# Patient Record
Sex: FEMALE | Race: WHITE | NOT HISPANIC OR LATINO | Employment: OTHER | URBAN - METROPOLITAN AREA
[De-identification: names, ages, dates, MRNs, and addresses within clinical notes are randomized per-mention and may not be internally consistent; named-entity substitution may affect disease eponyms.]

---

## 2024-01-01 ENCOUNTER — APPOINTMENT (OUTPATIENT)
Dept: OCCUPATIONAL THERAPY | Facility: HOSPITAL | Age: 89
End: 2024-01-01
Attending: STUDENT IN AN ORGANIZED HEALTH CARE EDUCATION/TRAINING PROGRAM
Payer: MEDICARE

## 2024-01-01 ENCOUNTER — APPOINTMENT (OUTPATIENT)
Dept: PHYSICAL THERAPY | Facility: HOSPITAL | Age: 89
End: 2024-01-01
Attending: STUDENT IN AN ORGANIZED HEALTH CARE EDUCATION/TRAINING PROGRAM
Payer: MEDICARE

## 2024-01-01 ENCOUNTER — APPOINTMENT (OUTPATIENT)
Dept: CT IMAGING | Facility: HOSPITAL | Age: 89
End: 2024-01-01
Attending: EMERGENCY MEDICINE
Payer: MEDICARE

## 2024-01-01 ENCOUNTER — LAB REQUISITION (OUTPATIENT)
Dept: LAB | Facility: CLINIC | Age: 89
End: 2024-01-01
Payer: MEDICARE

## 2024-01-01 ENCOUNTER — APPOINTMENT (OUTPATIENT)
Dept: RADIOLOGY | Facility: HOSPITAL | Age: 89
End: 2024-01-01
Attending: EMERGENCY MEDICINE
Payer: MEDICARE

## 2024-01-01 ENCOUNTER — HOSPITAL ENCOUNTER (OUTPATIENT)
Facility: HOSPITAL | Age: 89
Setting detail: OBSERVATION
Discharge: NURSING, REHAB OR LONG-TERM CARE | End: 2024-08-31
Attending: EMERGENCY MEDICINE | Admitting: EMERGENCY MEDICINE
Payer: MEDICARE

## 2024-01-01 ENCOUNTER — HOSPITAL ENCOUNTER (EMERGENCY)
Facility: HOSPITAL | Age: 89
Discharge: NURSING, REHAB OR LONG-TERM CARE | End: 2024-05-23
Attending: EMERGENCY MEDICINE | Admitting: EMERGENCY MEDICINE
Payer: MEDICARE

## 2024-01-01 ENCOUNTER — LAB REQUISITION (OUTPATIENT)
Dept: LAB | Facility: CLINIC | Age: 89
End: 2024-01-01
Payer: OTHER MISCELLANEOUS

## 2024-01-01 ENCOUNTER — APPOINTMENT (OUTPATIENT)
Dept: MRI IMAGING | Facility: HOSPITAL | Age: 89
End: 2024-01-01
Attending: EMERGENCY MEDICINE
Payer: MEDICARE

## 2024-01-01 ENCOUNTER — APPOINTMENT (OUTPATIENT)
Dept: ULTRASOUND IMAGING | Facility: HOSPITAL | Age: 89
End: 2024-01-01
Attending: STUDENT IN AN ORGANIZED HEALTH CARE EDUCATION/TRAINING PROGRAM
Payer: MEDICARE

## 2024-01-01 ENCOUNTER — APPOINTMENT (OUTPATIENT)
Dept: CARDIOLOGY | Facility: HOSPITAL | Age: 89
End: 2024-01-01
Attending: STUDENT IN AN ORGANIZED HEALTH CARE EDUCATION/TRAINING PROGRAM
Payer: MEDICARE

## 2024-01-01 VITALS
RESPIRATION RATE: 18 BRPM | TEMPERATURE: 98.8 F | SYSTOLIC BLOOD PRESSURE: 125 MMHG | DIASTOLIC BLOOD PRESSURE: 88 MMHG | HEIGHT: 71 IN | WEIGHT: 140 LBS | BODY MASS INDEX: 19.6 KG/M2 | HEART RATE: 95 BPM | OXYGEN SATURATION: 100 %

## 2024-01-01 VITALS
HEART RATE: 78 BPM | OXYGEN SATURATION: 100 % | SYSTOLIC BLOOD PRESSURE: 187 MMHG | TEMPERATURE: 98.8 F | DIASTOLIC BLOOD PRESSURE: 95 MMHG | RESPIRATION RATE: 15 BRPM

## 2024-01-01 DIAGNOSIS — E03.8 OTHER SPECIFIED HYPOTHYROIDISM: ICD-10-CM

## 2024-01-01 DIAGNOSIS — G30.1 ALZHEIMER'S DISEASE WITH LATE ONSET (CODE) (H): ICD-10-CM

## 2024-01-01 DIAGNOSIS — S22.000A THORACIC COMPRESSION FRACTURE, CLOSED, INITIAL ENCOUNTER (H): ICD-10-CM

## 2024-01-01 DIAGNOSIS — R63.0 ANOREXIA: ICD-10-CM

## 2024-01-01 DIAGNOSIS — R29.6 REPEATED FALLS: ICD-10-CM

## 2024-01-01 DIAGNOSIS — L89.90 PRESSURE INJURY OF SKIN, UNSPECIFIED INJURY STAGE, UNSPECIFIED LOCATION: ICD-10-CM

## 2024-01-01 DIAGNOSIS — S32.000A LUMBAR COMPRESSION FRACTURE, CLOSED, INITIAL ENCOUNTER (H): ICD-10-CM

## 2024-01-01 DIAGNOSIS — R55 SYNCOPE AND COLLAPSE: ICD-10-CM

## 2024-01-01 DIAGNOSIS — I10 ESSENTIAL (PRIMARY) HYPERTENSION: ICD-10-CM

## 2024-01-01 DIAGNOSIS — R10.2 PELVIC PAIN: ICD-10-CM
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
## 2024-01-01 DIAGNOSIS — M15.8 OTHER POLYOSTEOARTHRITIS: ICD-10-CM

## 2024-01-01 LAB
ALBUMIN SERPL BCG-MCNC: 3.7 G/DL (ref 3.5–5.2)
ALBUMIN SERPL BCG-MCNC: 4.1 G/DL (ref 3.5–5.2)
ALBUMIN UR-MCNC: 20 MG/DL
ALP SERPL-CCNC: 88 U/L (ref 40–150)
ALP SERPL-CCNC: 91 U/L (ref 40–150)
ALT SERPL W P-5'-P-CCNC: 10 U/L (ref 0–50)
ALT SERPL W P-5'-P-CCNC: 14 U/L (ref 0–50)
ANION GAP SERPL CALCULATED.3IONS-SCNC: 11 MMOL/L (ref 7–15)
ANION GAP SERPL CALCULATED.3IONS-SCNC: 11 MMOL/L (ref 7–15)
ANION GAP SERPL CALCULATED.3IONS-SCNC: 12 MMOL/L (ref 7–15)
ANION GAP SERPL CALCULATED.3IONS-SCNC: 18 MMOL/L (ref 7–15)
APPEARANCE UR: CLEAR
AST SERPL W P-5'-P-CCNC: 21 U/L (ref 0–45)
AST SERPL W P-5'-P-CCNC: 26 U/L (ref 0–45)
ATRIAL RATE - MUSE: 76 BPM
ATRIAL RATE - MUSE: 80 BPM
BASOPHILS # BLD AUTO: 0.1 10E3/UL (ref 0–0.2)
BASOPHILS NFR BLD AUTO: 1 %
BILIRUB SERPL-MCNC: 0.4 MG/DL
BILIRUB SERPL-MCNC: 0.5 MG/DL
BILIRUB UR QL STRIP: NEGATIVE
BUN SERPL-MCNC: 15.3 MG/DL (ref 8–23)
BUN SERPL-MCNC: 17 MG/DL (ref 8–23)
BUN SERPL-MCNC: 19.3 MG/DL (ref 8–23)
BUN SERPL-MCNC: 24.4 MG/DL (ref 8–23)
CALCIUM SERPL-MCNC: 10 MG/DL (ref 8.8–10.2)
CALCIUM SERPL-MCNC: 9.1 MG/DL (ref 8.8–10.4)
CALCIUM SERPL-MCNC: 9.4 MG/DL (ref 8.8–10.4)
CALCIUM SERPL-MCNC: 9.5 MG/DL (ref 8.8–10.2)
CHLORIDE SERPL-SCNC: 105 MMOL/L (ref 98–107)
CHLORIDE SERPL-SCNC: 106 MMOL/L (ref 98–107)
CHLORIDE SERPL-SCNC: 107 MMOL/L (ref 98–107)
CHLORIDE SERPL-SCNC: 108 MMOL/L (ref 98–107)
COLOR UR AUTO: YELLOW
CREAT SERPL-MCNC: 0.82 MG/DL (ref 0.51–0.95)
CREAT SERPL-MCNC: 0.93 MG/DL (ref 0.51–0.95)
CREAT SERPL-MCNC: 1.01 MG/DL (ref 0.51–0.95)
CREAT SERPL-MCNC: 1.21 MG/DL (ref 0.51–0.95)
DEPRECATED HCO3 PLAS-SCNC: 16 MMOL/L (ref 22–29)
DEPRECATED HCO3 PLAS-SCNC: 23 MMOL/L (ref 22–29)
DIASTOLIC BLOOD PRESSURE - MUSE: 90 MMHG
DIASTOLIC BLOOD PRESSURE - MUSE: NORMAL MMHG
EGFRCR SERPLBLD CKD-EPI 2021: 43 ML/MIN/1.73M2
EGFRCR SERPLBLD CKD-EPI 2021: 54 ML/MIN/1.73M2
EGFRCR SERPLBLD CKD-EPI 2021: 59 ML/MIN/1.73M2
EGFRCR SERPLBLD CKD-EPI 2021: 69 ML/MIN/1.73M2
EOSINOPHIL # BLD AUTO: 0.1 10E3/UL (ref 0–0.7)
EOSINOPHIL NFR BLD AUTO: 1 %
ERYTHROCYTE [DISTWIDTH] IN BLOOD BY AUTOMATED COUNT: 12.9 % (ref 10–15)
ERYTHROCYTE [DISTWIDTH] IN BLOOD BY AUTOMATED COUNT: 12.9 % (ref 10–15)
ERYTHROCYTE [DISTWIDTH] IN BLOOD BY AUTOMATED COUNT: 13.5 % (ref 10–15)
ERYTHROCYTE [DISTWIDTH] IN BLOOD BY AUTOMATED COUNT: 14.2 % (ref 10–15)
GLUCOSE BLDC GLUCOMTR-MCNC: 102 MG/DL (ref 70–99)
GLUCOSE BLDC GLUCOMTR-MCNC: 76 MG/DL (ref 70–99)
GLUCOSE SERPL-MCNC: 125 MG/DL (ref 70–99)
GLUCOSE SERPL-MCNC: 136 MG/DL (ref 70–99)
GLUCOSE SERPL-MCNC: 75 MG/DL (ref 70–99)
GLUCOSE SERPL-MCNC: 82 MG/DL (ref 70–99)
GLUCOSE UR STRIP-MCNC: NEGATIVE MG/DL
HBA1C MFR BLD: 5.3 %
HCO3 SERPL-SCNC: 21 MMOL/L (ref 22–29)
HCO3 SERPL-SCNC: 23 MMOL/L (ref 22–29)
HCT VFR BLD AUTO: 39.1 % (ref 35–47)
HCT VFR BLD AUTO: 39.1 % (ref 35–47)
HCT VFR BLD AUTO: 41 % (ref 35–47)
HCT VFR BLD AUTO: 47.1 % (ref 35–47)
HGB BLD-MCNC: 12.8 G/DL (ref 11.7–15.7)
HGB BLD-MCNC: 12.9 G/DL (ref 11.7–15.7)
HGB BLD-MCNC: 13.4 G/DL (ref 11.7–15.7)
HGB BLD-MCNC: 15.5 G/DL (ref 11.7–15.7)
HGB UR QL STRIP: NEGATIVE
HOLD SPECIMEN: NORMAL
HYALINE CASTS: 6 /LPF
IMM GRANULOCYTES # BLD: 0 10E3/UL
IMM GRANULOCYTES NFR BLD: 0 %
INTERPRETATION ECG - MUSE: NORMAL
INTERPRETATION ECG - MUSE: NORMAL
KETONES UR STRIP-MCNC: ABNORMAL MG/DL
LEUKOCYTE ESTERASE UR QL STRIP: ABNORMAL
LVEF ECHO: NORMAL
LYMPHOCYTES # BLD AUTO: 1 10E3/UL (ref 0.8–5.3)
LYMPHOCYTES NFR BLD AUTO: 12 %
MAGNESIUM SERPL-MCNC: 2 MG/DL (ref 1.7–2.3)
MAGNESIUM SERPL-MCNC: 2 MG/DL (ref 1.7–2.3)
MCH RBC QN AUTO: 31.8 PG (ref 26.5–33)
MCH RBC QN AUTO: 32.7 PG (ref 26.5–33)
MCH RBC QN AUTO: 32.8 PG (ref 26.5–33)
MCH RBC QN AUTO: 33 PG (ref 26.5–33)
MCHC RBC AUTO-ENTMCNC: 32.7 G/DL (ref 31.5–36.5)
MCHC RBC AUTO-ENTMCNC: 32.7 G/DL (ref 31.5–36.5)
MCHC RBC AUTO-ENTMCNC: 32.9 G/DL (ref 31.5–36.5)
MCHC RBC AUTO-ENTMCNC: 33 G/DL (ref 31.5–36.5)
MCV RBC AUTO: 100 FL (ref 78–100)
MCV RBC AUTO: 97 FL (ref 78–100)
MONOCYTES # BLD AUTO: 0.4 10E3/UL (ref 0–1.3)
MONOCYTES NFR BLD AUTO: 5 %
MUCOUS THREADS #/AREA URNS LPF: PRESENT /LPF
NEUTROPHILS # BLD AUTO: 6.6 10E3/UL (ref 1.6–8.3)
NEUTROPHILS NFR BLD AUTO: 80 %
NITRATE UR QL: NEGATIVE
NRBC # BLD AUTO: 0 10E3/UL
NRBC BLD AUTO-RTO: 0 /100
P AXIS - MUSE: 63 DEGREES
P AXIS - MUSE: 81 DEGREES
PH UR STRIP: 6.5 [PH] (ref 5–7)
PHOSPHATE SERPL-MCNC: 2.8 MG/DL (ref 2.5–4.5)
PHOSPHATE SERPL-MCNC: 3 MG/DL (ref 2.5–4.5)
PLATELET # BLD AUTO: 238 10E3/UL (ref 150–450)
PLATELET # BLD AUTO: 275 10E3/UL (ref 150–450)
PLATELET # BLD AUTO: 290 10E3/UL (ref 150–450)
PLATELET # BLD AUTO: 295 10E3/UL (ref 150–450)
POTASSIUM SERPL-SCNC: 3.8 MMOL/L (ref 3.4–5.3)
POTASSIUM SERPL-SCNC: 4.1 MMOL/L (ref 3.4–5.3)
POTASSIUM SERPL-SCNC: 4.4 MMOL/L (ref 3.4–5.3)
POTASSIUM SERPL-SCNC: 4.5 MMOL/L (ref 3.4–5.3)
PR INTERVAL - MUSE: 148 MS
PR INTERVAL - MUSE: 184 MS
PROT SERPL-MCNC: 6.8 G/DL (ref 6.4–8.3)
PROT SERPL-MCNC: 7.2 G/DL (ref 6.4–8.3)
QRS DURATION - MUSE: 78 MS
QRS DURATION - MUSE: 82 MS
QT - MUSE: 402 MS
QT - MUSE: 404 MS
QTC - MUSE: 451 MS
QTC - MUSE: 463 MS
R AXIS - MUSE: 26 DEGREES
R AXIS - MUSE: 9 DEGREES
RBC # BLD AUTO: 3.91 10E6/UL (ref 3.8–5.2)
RBC # BLD AUTO: 3.92 10E6/UL (ref 3.8–5.2)
RBC # BLD AUTO: 4.21 10E6/UL (ref 3.8–5.2)
RBC # BLD AUTO: 4.72 10E6/UL (ref 3.8–5.2)
RBC URINE: 1 /HPF
SODIUM SERPL-SCNC: 139 MMOL/L (ref 135–145)
SODIUM SERPL-SCNC: 140 MMOL/L (ref 135–145)
SODIUM SERPL-SCNC: 140 MMOL/L (ref 135–145)
SODIUM SERPL-SCNC: 142 MMOL/L (ref 135–145)
SP GR UR STRIP: 1.02 (ref 1–1.03)
SQUAMOUS EPITHELIAL: <1 /HPF
SYSTOLIC BLOOD PRESSURE - MUSE: 193 MMHG
SYSTOLIC BLOOD PRESSURE - MUSE: NORMAL MMHG
T AXIS - MUSE: 83 DEGREES
T AXIS - MUSE: 87 DEGREES
T4 FREE SERPL-MCNC: 1.48 NG/DL (ref 0.9–1.7)
T4 FREE SERPL-MCNC: 1.64 NG/DL (ref 0.9–1.7)
T4 FREE SERPL-MCNC: 1.79 NG/DL (ref 0.9–1.7)
TSH SERPL DL<=0.005 MIU/L-ACNC: 0.29 UIU/ML (ref 0.3–4.2)
TSH SERPL DL<=0.005 MIU/L-ACNC: 0.85 UIU/ML (ref 0.3–4.2)
TSH SERPL DL<=0.005 MIU/L-ACNC: 1.57 UIU/ML (ref 0.3–4.2)
TSH SERPL DL<=0.005 MIU/L-ACNC: 43.8 UIU/ML (ref 0.3–4.2)
TSH SERPL DL<=0.005 MIU/L-ACNC: 5.43 UIU/ML (ref 0.3–4.2)
TSH SERPL DL<=0.005 MIU/L-ACNC: 8.97 UIU/ML (ref 0.3–4.2)
UROBILINOGEN UR STRIP-MCNC: 2 MG/DL
VENTRICULAR RATE- MUSE: 75 BPM
VENTRICULAR RATE- MUSE: 80 BPM
WBC # BLD AUTO: 6.2 10E3/UL (ref 4–11)
WBC # BLD AUTO: 6.4 10E3/UL (ref 4–11)
WBC # BLD AUTO: 7.7 10E3/UL (ref 4–11)
WBC # BLD AUTO: 8.2 10E3/UL (ref 4–11)
WBC URINE: 1 /HPF

## 2024-01-01 PROCEDURE — 99284 EMERGENCY DEPT VISIT MOD MDM: CPT | Mod: 25

## 2024-01-01 PROCEDURE — 99204 OFFICE O/P NEW MOD 45 MIN: CPT | Performed by: NURSE PRACTITIONER

## 2024-01-01 PROCEDURE — 72192 CT PELVIS W/O DYE: CPT | Mod: MA

## 2024-01-01 PROCEDURE — 250N000013 HC RX MED GY IP 250 OP 250 PS 637: Performed by: STUDENT IN AN ORGANIZED HEALTH CARE EDUCATION/TRAINING PROGRAM

## 2024-01-01 PROCEDURE — 76770 US EXAM ABDO BACK WALL COMP: CPT

## 2024-01-01 PROCEDURE — 258N000003 HC RX IP 258 OP 636: Performed by: EMERGENCY MEDICINE

## 2024-01-01 PROCEDURE — 36415 COLL VENOUS BLD VENIPUNCTURE: CPT | Mod: ORL | Performed by: FAMILY MEDICINE

## 2024-01-01 PROCEDURE — 82962 GLUCOSE BLOOD TEST: CPT

## 2024-01-01 PROCEDURE — 96375 TX/PRO/DX INJ NEW DRUG ADDON: CPT

## 2024-01-01 PROCEDURE — 250N000011 HC RX IP 250 OP 636: Performed by: EMERGENCY MEDICINE

## 2024-01-01 PROCEDURE — 73522 X-RAY EXAM HIPS BI 3-4 VIEWS: CPT

## 2024-01-01 PROCEDURE — 84295 ASSAY OF SERUM SODIUM: CPT | Performed by: EMERGENCY MEDICINE

## 2024-01-01 PROCEDURE — P9604 ONE-WAY ALLOW PRORATED TRIP: HCPCS | Mod: ORL | Performed by: NURSE PRACTITIONER

## 2024-01-01 PROCEDURE — 83735 ASSAY OF MAGNESIUM: CPT | Performed by: STUDENT IN AN ORGANIZED HEALTH CARE EDUCATION/TRAINING PROGRAM

## 2024-01-01 PROCEDURE — 81001 URINALYSIS AUTO W/SCOPE: CPT | Performed by: EMERGENCY MEDICINE

## 2024-01-01 PROCEDURE — 85027 COMPLETE CBC AUTOMATED: CPT | Performed by: STUDENT IN AN ORGANIZED HEALTH CARE EDUCATION/TRAINING PROGRAM

## 2024-01-01 PROCEDURE — 258N000003 HC RX IP 258 OP 636: Performed by: STUDENT IN AN ORGANIZED HEALTH CARE EDUCATION/TRAINING PROGRAM

## 2024-01-01 PROCEDURE — 36415 COLL VENOUS BLD VENIPUNCTURE: CPT | Performed by: STUDENT IN AN ORGANIZED HEALTH CARE EDUCATION/TRAINING PROGRAM

## 2024-01-01 PROCEDURE — 84439 ASSAY OF FREE THYROXINE: CPT | Mod: ORL | Performed by: FAMILY MEDICINE

## 2024-01-01 PROCEDURE — 84443 ASSAY THYROID STIM HORMONE: CPT | Mod: ORL | Performed by: NURSE PRACTITIONER

## 2024-01-01 PROCEDURE — 99223 1ST HOSP IP/OBS HIGH 75: CPT | Mod: AI | Performed by: STUDENT IN AN ORGANIZED HEALTH CARE EDUCATION/TRAINING PROGRAM

## 2024-01-01 PROCEDURE — 84443 ASSAY THYROID STIM HORMONE: CPT | Performed by: STUDENT IN AN ORGANIZED HEALTH CARE EDUCATION/TRAINING PROGRAM

## 2024-01-01 PROCEDURE — 72131 CT LUMBAR SPINE W/O DYE: CPT | Mod: MA

## 2024-01-01 PROCEDURE — 84443 ASSAY THYROID STIM HORMONE: CPT | Mod: ORL | Performed by: FAMILY MEDICINE

## 2024-01-01 PROCEDURE — 84100 ASSAY OF PHOSPHORUS: CPT | Performed by: STUDENT IN AN ORGANIZED HEALTH CARE EDUCATION/TRAINING PROGRAM

## 2024-01-01 PROCEDURE — 85027 COMPLETE CBC AUTOMATED: CPT | Mod: ORL | Performed by: FAMILY MEDICINE

## 2024-01-01 PROCEDURE — 97535 SELF CARE MNGMENT TRAINING: CPT | Mod: GO

## 2024-01-01 PROCEDURE — 97530 THERAPEUTIC ACTIVITIES: CPT | Mod: GP

## 2024-01-01 PROCEDURE — G0378 HOSPITAL OBSERVATION PER HR: HCPCS

## 2024-01-01 PROCEDURE — 80053 COMPREHEN METABOLIC PANEL: CPT | Performed by: STUDENT IN AN ORGANIZED HEALTH CARE EDUCATION/TRAINING PROGRAM

## 2024-01-01 PROCEDURE — 72128 CT CHEST SPINE W/O DYE: CPT | Mod: MA

## 2024-01-01 PROCEDURE — 72146 MRI CHEST SPINE W/O DYE: CPT | Mod: MA

## 2024-01-01 PROCEDURE — 83036 HEMOGLOBIN GLYCOSYLATED A1C: CPT | Performed by: STUDENT IN AN ORGANIZED HEALTH CARE EDUCATION/TRAINING PROGRAM

## 2024-01-01 PROCEDURE — 96361 HYDRATE IV INFUSION ADD-ON: CPT

## 2024-01-01 PROCEDURE — 97161 PT EVAL LOW COMPLEX 20 MIN: CPT | Mod: GP

## 2024-01-01 PROCEDURE — 93306 TTE W/DOPPLER COMPLETE: CPT | Mod: 26 | Performed by: INTERNAL MEDICINE

## 2024-01-01 PROCEDURE — 99285 EMERGENCY DEPT VISIT HI MDM: CPT | Mod: 25

## 2024-01-01 PROCEDURE — 97166 OT EVAL MOD COMPLEX 45 MIN: CPT | Mod: GO

## 2024-01-01 PROCEDURE — 93005 ELECTROCARDIOGRAM TRACING: CPT | Performed by: STUDENT IN AN ORGANIZED HEALTH CARE EDUCATION/TRAINING PROGRAM

## 2024-01-01 PROCEDURE — 96376 TX/PRO/DX INJ SAME DRUG ADON: CPT

## 2024-01-01 PROCEDURE — 96374 THER/PROPH/DIAG INJ IV PUSH: CPT

## 2024-01-01 PROCEDURE — G0463 HOSPITAL OUTPT CLINIC VISIT: HCPCS | Mod: 25

## 2024-01-01 PROCEDURE — 85025 COMPLETE CBC W/AUTO DIFF WBC: CPT | Performed by: EMERGENCY MEDICINE

## 2024-01-01 PROCEDURE — 93306 TTE W/DOPPLER COMPLETE: CPT

## 2024-01-01 PROCEDURE — 80053 COMPREHEN METABOLIC PANEL: CPT | Mod: ORL | Performed by: NURSE PRACTITIONER

## 2024-01-01 PROCEDURE — P9604 ONE-WAY ALLOW PRORATED TRIP: HCPCS | Mod: ORL | Performed by: FAMILY MEDICINE

## 2024-01-01 PROCEDURE — 250N000011 HC RX IP 250 OP 636: Performed by: STUDENT IN AN ORGANIZED HEALTH CARE EDUCATION/TRAINING PROGRAM

## 2024-01-01 PROCEDURE — 93005 ELECTROCARDIOGRAM TRACING: CPT | Performed by: EMERGENCY MEDICINE

## 2024-01-01 PROCEDURE — 99232 SBSQ HOSP IP/OBS MODERATE 35: CPT | Performed by: INTERNAL MEDICINE

## 2024-01-01 PROCEDURE — 72220 X-RAY EXAM SACRUM TAILBONE: CPT

## 2024-01-01 PROCEDURE — 99239 HOSP IP/OBS DSCHRG MGMT >30: CPT | Performed by: INTERNAL MEDICINE

## 2024-01-01 PROCEDURE — 97110 THERAPEUTIC EXERCISES: CPT | Mod: GP

## 2024-01-01 PROCEDURE — 85027 COMPLETE CBC AUTOMATED: CPT | Mod: ORL | Performed by: NURSE PRACTITIONER

## 2024-01-01 PROCEDURE — 70450 CT HEAD/BRAIN W/O DYE: CPT | Mod: MA

## 2024-01-01 PROCEDURE — 80048 BASIC METABOLIC PNL TOTAL CA: CPT | Mod: ORL | Performed by: FAMILY MEDICINE

## 2024-01-01 PROCEDURE — 36415 COLL VENOUS BLD VENIPUNCTURE: CPT | Mod: ORL | Performed by: NURSE PRACTITIONER

## 2024-01-01 PROCEDURE — 72148 MRI LUMBAR SPINE W/O DYE: CPT | Mod: MA

## 2024-01-01 PROCEDURE — P9603 ONE-WAY ALLOW PRORATED MILES: HCPCS | Mod: ORL | Performed by: FAMILY MEDICINE

## 2024-01-01 RX ORDER — NALOXONE HYDROCHLORIDE 0.4 MG/ML
0.2 INJECTION, SOLUTION INTRAMUSCULAR; INTRAVENOUS; SUBCUTANEOUS
Status: DISCONTINUED | OUTPATIENT
Start: 2024-01-01 | End: 2024-01-01 | Stop reason: HOSPADM

## 2024-01-01 RX ORDER — HYDROMORPHONE HCL IN WATER/PF 6 MG/30 ML
0.5 PATIENT CONTROLLED ANALGESIA SYRINGE INTRAVENOUS EVERY 4 HOURS PRN
Status: COMPLETED | OUTPATIENT
Start: 2024-01-01 | End: 2024-01-01

## 2024-01-01 RX ORDER — LEVOTHYROXINE SODIUM 25 UG/1
75 TABLET ORAL DAILY
Status: DISCONTINUED | OUTPATIENT
Start: 2024-01-01 | End: 2024-01-01 | Stop reason: HOSPADM

## 2024-01-01 RX ORDER — ACETAMINOPHEN 500 MG
1000 TABLET ORAL 2 TIMES DAILY
COMMUNITY

## 2024-01-01 RX ORDER — ONDANSETRON 2 MG/ML
4 INJECTION INTRAMUSCULAR; INTRAVENOUS ONCE
Status: COMPLETED | OUTPATIENT
Start: 2024-01-01 | End: 2024-01-01

## 2024-01-01 RX ORDER — LIDOCAINE 40 MG/G
CREAM TOPICAL
Status: DISCONTINUED | OUTPATIENT
Start: 2024-01-01 | End: 2024-01-01 | Stop reason: HOSPADM

## 2024-01-01 RX ORDER — NALOXONE HYDROCHLORIDE 0.4 MG/ML
0.4 INJECTION, SOLUTION INTRAMUSCULAR; INTRAVENOUS; SUBCUTANEOUS
Status: DISCONTINUED | OUTPATIENT
Start: 2024-01-01 | End: 2024-01-01 | Stop reason: HOSPADM

## 2024-01-01 RX ORDER — POLYETHYLENE GLYCOL 3350 17 G/17G
17 POWDER, FOR SOLUTION ORAL DAILY
Status: DISCONTINUED | OUTPATIENT
Start: 2024-01-01 | End: 2024-01-01 | Stop reason: HOSPADM

## 2024-01-01 RX ORDER — MIRTAZAPINE 7.5 MG/1
7.5 TABLET, FILM COATED ORAL AT BEDTIME
Status: DISCONTINUED | OUTPATIENT
Start: 2024-01-01 | End: 2024-01-01 | Stop reason: HOSPADM

## 2024-01-01 RX ORDER — ACETAMINOPHEN 500 MG
1000 TABLET ORAL 2 TIMES DAILY
Status: DISCONTINUED | OUTPATIENT
Start: 2024-01-01 | End: 2024-01-01 | Stop reason: HOSPADM

## 2024-01-01 RX ORDER — MEGESTROL ACETATE 20 MG/1
20 TABLET ORAL 2 TIMES DAILY
COMMUNITY

## 2024-01-01 RX ORDER — AMOXICILLIN 250 MG
2 CAPSULE ORAL 2 TIMES DAILY PRN
Status: DISCONTINUED | OUTPATIENT
Start: 2024-01-01 | End: 2024-01-01 | Stop reason: HOSPADM

## 2024-01-01 RX ORDER — HYDRALAZINE HYDROCHLORIDE 10 MG/1
10 TABLET, FILM COATED ORAL EVERY 4 HOURS PRN
Status: DISCONTINUED | OUTPATIENT
Start: 2024-01-01 | End: 2024-01-01 | Stop reason: HOSPADM

## 2024-01-01 RX ORDER — ONDANSETRON 2 MG/ML
4 INJECTION INTRAMUSCULAR; INTRAVENOUS EVERY 6 HOURS PRN
Status: DISCONTINUED | OUTPATIENT
Start: 2024-01-01 | End: 2024-01-01 | Stop reason: HOSPADM

## 2024-01-01 RX ORDER — POLYETHYLENE GLYCOL 3350 17 G/17G
1 POWDER, FOR SOLUTION ORAL DAILY
COMMUNITY

## 2024-01-01 RX ORDER — HYDRALAZINE HYDROCHLORIDE 20 MG/ML
10 INJECTION INTRAMUSCULAR; INTRAVENOUS EVERY 4 HOURS PRN
Status: DISCONTINUED | OUTPATIENT
Start: 2024-01-01 | End: 2024-01-01 | Stop reason: HOSPADM

## 2024-01-01 RX ORDER — ONDANSETRON 4 MG/1
4 TABLET, ORALLY DISINTEGRATING ORAL EVERY 6 HOURS PRN
Status: DISCONTINUED | OUTPATIENT
Start: 2024-01-01 | End: 2024-01-01 | Stop reason: HOSPADM

## 2024-01-01 RX ORDER — MIRTAZAPINE 7.5 MG/1
7.5 TABLET, FILM COATED ORAL AT BEDTIME
COMMUNITY

## 2024-01-01 RX ORDER — OXYCODONE HYDROCHLORIDE 5 MG/1
5 TABLET ORAL EVERY 4 HOURS PRN
Status: DISCONTINUED | OUTPATIENT
Start: 2024-01-01 | End: 2024-01-01 | Stop reason: HOSPADM

## 2024-01-01 RX ORDER — HYDROMORPHONE HCL IN WATER/PF 6 MG/30 ML
0.5 PATIENT CONTROLLED ANALGESIA SYRINGE INTRAVENOUS
Status: DISCONTINUED | OUTPATIENT
Start: 2024-01-01 | End: 2024-01-01

## 2024-01-01 RX ORDER — LIDOCAINE 4 G/G
1 PATCH TOPICAL 2 TIMES DAILY
COMMUNITY

## 2024-01-01 RX ORDER — MEGESTROL ACETATE 20 MG/1
20 TABLET ORAL 2 TIMES DAILY
Status: DISCONTINUED | OUTPATIENT
Start: 2024-01-01 | End: 2024-01-01 | Stop reason: HOSPADM

## 2024-01-01 RX ORDER — MEMANTINE HYDROCHLORIDE 5 MG/1
5 TABLET ORAL DAILY
Status: DISCONTINUED | OUTPATIENT
Start: 2024-01-01 | End: 2024-01-01 | Stop reason: HOSPADM

## 2024-01-01 RX ORDER — CALCIUM CARBONATE 500 MG/1
1000 TABLET, CHEWABLE ORAL 4 TIMES DAILY PRN
Status: DISCONTINUED | OUTPATIENT
Start: 2024-01-01 | End: 2024-01-01 | Stop reason: HOSPADM

## 2024-01-01 RX ORDER — MEMANTINE HYDROCHLORIDE 5 MG/1
5 TABLET ORAL DAILY
COMMUNITY

## 2024-01-01 RX ORDER — DOCUSATE SODIUM 100 MG/1
100 CAPSULE, LIQUID FILLED ORAL 2 TIMES DAILY PRN
COMMUNITY

## 2024-01-01 RX ORDER — AMOXICILLIN 250 MG
1 CAPSULE ORAL 2 TIMES DAILY PRN
Status: DISCONTINUED | OUTPATIENT
Start: 2024-01-01 | End: 2024-01-01 | Stop reason: HOSPADM

## 2024-01-01 RX ORDER — LEVOTHYROXINE SODIUM 75 UG/1
75 TABLET ORAL DAILY
COMMUNITY

## 2024-01-01 RX ORDER — HYDROMORPHONE HYDROCHLORIDE 1 MG/ML
0.5 INJECTION, SOLUTION INTRAMUSCULAR; INTRAVENOUS; SUBCUTANEOUS
Status: COMPLETED | OUTPATIENT
Start: 2024-01-01 | End: 2024-01-01

## 2024-01-01 RX ORDER — SODIUM CHLORIDE 9 MG/ML
INJECTION, SOLUTION INTRAVENOUS CONTINUOUS
Status: DISCONTINUED | OUTPATIENT
Start: 2024-01-01 | End: 2024-01-01

## 2024-01-01 RX ADMIN — ACETAMINOPHEN 1000 MG: 500 TABLET ORAL at 09:22

## 2024-01-01 RX ADMIN — ACETAMINOPHEN 1000 MG: 500 TABLET ORAL at 09:33

## 2024-01-01 RX ADMIN — MEGESTROL ACETATE 20 MG: 20 TABLET ORAL at 09:23

## 2024-01-01 RX ADMIN — HYDROMORPHONE HYDROCHLORIDE 0.5 MG: 1 INJECTION, SOLUTION INTRAMUSCULAR; INTRAVENOUS; SUBCUTANEOUS at 12:45

## 2024-01-01 RX ADMIN — SODIUM CHLORIDE 500 ML: 9 INJECTION, SOLUTION INTRAVENOUS at 14:18

## 2024-01-01 RX ADMIN — OXYCODONE HYDROCHLORIDE 2.5 MG: 5 TABLET ORAL at 00:38

## 2024-01-01 RX ADMIN — ACETAMINOPHEN 1000 MG: 500 TABLET ORAL at 21:49

## 2024-01-01 RX ADMIN — MIRTAZAPINE 7.5 MG: 7.5 TABLET, FILM COATED ORAL at 21:31

## 2024-01-01 RX ADMIN — MEGESTROL ACETATE 20 MG: 20 TABLET ORAL at 21:49

## 2024-01-01 RX ADMIN — SODIUM CHLORIDE 500 ML: 9 INJECTION, SOLUTION INTRAVENOUS at 11:29

## 2024-01-01 RX ADMIN — MEMANTINE 5 MG: 5 TABLET ORAL at 09:22

## 2024-01-01 RX ADMIN — POLYETHYLENE GLYCOL 3350 17 G: 17 POWDER, FOR SOLUTION ORAL at 09:22

## 2024-01-01 RX ADMIN — MEGESTROL ACETATE 20 MG: 20 TABLET ORAL at 09:36

## 2024-01-01 RX ADMIN — HYDROMORPHONE HYDROCHLORIDE 0.5 MG: 0.2 INJECTION, SOLUTION INTRAMUSCULAR; INTRAVENOUS; SUBCUTANEOUS at 17:45

## 2024-01-01 RX ADMIN — LEVOTHYROXINE SODIUM 75 MCG: 0.03 TABLET ORAL at 09:33

## 2024-01-01 RX ADMIN — POLYETHYLENE GLYCOL 3350 17 G: 17 POWDER, FOR SOLUTION ORAL at 09:33

## 2024-01-01 RX ADMIN — MIRTAZAPINE 7.5 MG: 7.5 TABLET, FILM COATED ORAL at 21:49

## 2024-01-01 RX ADMIN — MEMANTINE 5 MG: 5 TABLET ORAL at 09:36

## 2024-01-01 RX ADMIN — LEVOTHYROXINE SODIUM 75 MCG: 0.03 TABLET ORAL at 09:22

## 2024-01-01 RX ADMIN — OXYCODONE HYDROCHLORIDE 2.5 MG: 5 TABLET ORAL at 21:03

## 2024-01-01 RX ADMIN — SODIUM CHLORIDE: 9 INJECTION, SOLUTION INTRAVENOUS at 18:31

## 2024-01-01 RX ADMIN — ONDANSETRON 4 MG: 2 INJECTION INTRAMUSCULAR; INTRAVENOUS at 11:48

## 2024-01-01 ASSESSMENT — ACTIVITIES OF DAILY LIVING (ADL)
ADLS_ACUITY_SCORE: 56
ADLS_ACUITY_SCORE: 56
ADLS_ACUITY_SCORE: 43
ADLS_ACUITY_SCORE: 56
ADLS_ACUITY_SCORE: 56
ADLS_ACUITY_SCORE: 35
ADLS_ACUITY_SCORE: 47
ADLS_ACUITY_SCORE: 43
ADLS_ACUITY_SCORE: 56
ADLS_ACUITY_SCORE: 35
ADLS_ACUITY_SCORE: 35
ADLS_ACUITY_SCORE: 56
ADLS_ACUITY_SCORE: 56
ADLS_ACUITY_SCORE: 46
ADLS_ACUITY_SCORE: 35
ADLS_ACUITY_SCORE: 46
ADLS_ACUITY_SCORE: 56
ADLS_ACUITY_SCORE: 56
ADLS_ACUITY_SCORE: 35
ADLS_ACUITY_SCORE: 35
ADLS_ACUITY_SCORE: 56
ADLS_ACUITY_SCORE: 46
ADLS_ACUITY_SCORE: 35
ADLS_ACUITY_SCORE: 47
ADLS_ACUITY_SCORE: 46
ADLS_ACUITY_SCORE: 35
ADLS_ACUITY_SCORE: 35
ADLS_ACUITY_SCORE: 56
ADLS_ACUITY_SCORE: 47
ADLS_ACUITY_SCORE: 56
ADLS_ACUITY_SCORE: 35
ADLS_ACUITY_SCORE: 46
ADLS_ACUITY_SCORE: 47
DEPENDENT_IADLS:: CLEANING;COOKING;LAUNDRY;SHOPPING;MEAL PREPARATION;MEDICATION MANAGEMENT;MONEY MANAGEMENT;TRANSPORTATION;INCONTINENCE
ADLS_ACUITY_SCORE: 35
ADLS_ACUITY_SCORE: 56
ADLS_ACUITY_SCORE: 35
ADLS_ACUITY_SCORE: 56
ADLS_ACUITY_SCORE: 35
ADLS_ACUITY_SCORE: 56
ADLS_ACUITY_SCORE: 35

## 2024-01-01 ASSESSMENT — COLUMBIA-SUICIDE SEVERITY RATING SCALE - C-SSRS
6. HAVE YOU EVER DONE ANYTHING, STARTED TO DO ANYTHING, OR PREPARED TO DO ANYTHING TO END YOUR LIFE?: NO
1. IN THE PAST MONTH, HAVE YOU WISHED YOU WERE DEAD OR WISHED YOU COULD GO TO SLEEP AND NOT WAKE UP?: NO
2. HAVE YOU ACTUALLY HAD ANY THOUGHTS OF KILLING YOURSELF IN THE PAST MONTH?: NO

## 2024-05-23 NOTE — ED TRIAGE NOTES
Pt is BIBA to ED for evaluation after an unwitnessed fall in her memory care. Around [id number], staff heard pt screaming and they found her in the bathroom floor. Unknown if there's LOC or if she hit her head on the floor. Not on blood thinners.   Medics reported pt complaining of hip and tail bone pain.   Pt has hx of Dementia.     Triage Assessment (Adult)       Row Name 05/22/24 4384          Triage Assessment    Airway WDL WDL        Respiratory WDL    Respiratory WDL WDL        Peripheral/Neurovascular WDL    Peripheral Neurovascular WDL WDL        Cognitive/Neuro/Behavioral WDL    Cognitive/Neuro/Behavioral WDL X;level of consciousness     Level of Consciousness confused     Arousal Level opens eyes spontaneously     Orientation person     Speech clear     Mood/Behavior cooperative        Pupils (CN II)    Pupil PERRLA yes     Pupil Size Left 2 mm     Pupil Size Right 2 mm        [place] Coma Scale    Best Eye Response 4-->(E4) spontaneous     Best Motor Response 6-->(M6) obeys commands     Best Verbal Response 4-->(V4) confused     [place] Coma Scale Score 14

## 2024-05-23 NOTE — ED PROVIDER NOTES
Emergency Department Encounter      NAME: Candace Vick  AGE: 89 year old female  YOB: 1935  MRN: [id number]  EVALUATION DATE & TIME: 2024  9:14 PM    PCP: Helen Craven    ED PROVIDER: Lon Sheppard M.D.      Chief Complaint   Patient presents with    Fall         FINAL IMPRESSION:  1. Fall, initial encounter    2. Pelvic pain        MEDICAL DECISION MAKIN:42 PM I met with the patient, obtained history, performed an initial exam, and discussed options and plan for diagnostics and treatment here in the ED.   11:29 PM I rechecked and updated patient. Made plans for discharge.     This patient is 89-year-old female with history of dementia who lives in the memory care unit and is transported here by ambulance for a fall.  The patient had an unwitnessed fall around 8 PM tonight.  The staff heard her scream in the bathroom.  She is not on blood thinners and the staff was unaware whether she had loss of consciousness or had any head injury.  They thought that she had hip pain.  The patient was a poor historian because of her dementia.  She did not have any tenderness consistent with a hip fracture or pelvic fracture.  I did not see any trauma on her head as well.  She had no neck tenderness.  I did do a CT of her head to rule out traumatic intracranial injury or skull fracture.  Head CT was negative.  I independently reviewed and interpreted the head CT.  I did a sacrum and coccyx x-ray as well because she did mention that her tailbone hurt.  The x-ray was negative for fracture.  I independently reviewed and interpreted the x-ray.  A pelvic x-ray and bilateral hip x-rays were done and were negative for fracture.  An EKG was done and it showed sinus rhythm with premature atrial contractions with heart rate 75.  There was no sign MI or ischemia.  I independently reviewed and interpreted the EKG.  I had the patient transferred back to the memory care unit as she did not have any additional  complaints.    Pertinent Labs & Imaging studies reviewed. (See chart for details)    The importance of close follow up was discussed. We reviewed warning signs and symptoms, and I instructed Ms. Vick to return to the emergency department immediately if she develops any new or worsening symptoms. I provided additional verbal discharge instructions. Ms. Vick expressed understanding and agreement with this plan of care, her questions were answered, and she was discharged in stable condition.     Medical Decision Making     History:  Supplemental history from: Documented in chart, if applicable  External Record(s) reviewed: Documented in chart, if applicable.     Work Up:  Chart documentation includes differential considered and any EKGs or imaging independently interpreted by provider, where specified.  In additional to work up documented, I considered the following work up: Documented in chart, if applicable.     External consultation:  Discussion of management with another provider: Documented in chart, if applicable     Complicating factors:  Care impacted by chronic illness: Dementia, COPD, HLD  Care affected by social determinants of health: Access to Medical Care     Disposition considerations: Discharge.       MEDICATIONS GIVEN IN THE EMERGENCY:  Medications - No data to display    NEW PRESCRIPTIONS STARTED AT TODAY'S ER VISIT:  There are no discharge medications for this patient.         =================================================================    HPI    Patient information was obtained from: RN & patient     Use of : N/A         Candace Vick is a 89 year old female with a past medical history of dementia, HLD, COPD, who presents for fall.     The patient presents from memory care, according to the EMS reports, patient had an unwitnessed fall between 8-8:10 PM this evening. She was found screaming in her bathroom, and it is unknown if she hit her head or lost consciousness. Patient reports  "\"racquel\" if her hip hurts\", and she said she \"can't remember it.\"       REVIEW OF SYSTEMS   Review of Systems   Musculoskeletal:         No hip or pelvis pain        PAST MEDICAL HISTORY:  History reviewed. No pertinent past medical history.    PAST SURGICAL HISTORY:  History reviewed. No pertinent surgical history.    CURRENT MEDICATIONS:    No current facility-administered medications for this encounter.  No current outpatient medications on file.    ALLERGIES:  Not on File    FAMILY HISTORY:  History reviewed. No pertinent family history.    SOCIAL HISTORY:   Social History     Socioeconomic History    Marital status:      Social Determinants of Health     Stress: No Stress Concern Present (5/11/2021)    Received from [facility]    [employer] [facility] [employer] [facility] - Occupational Stress Questionnaire     Feeling of Stress : Not at all   Interpersonal Safety: Not At Risk (5/11/2021)    Received from [facility]    Humiliation, Afraid, Rape, and Kick questionnaire     Fear of Current or Ex-Partner: No     Emotionally Abused: No     Physically Abused: No     Sexually Abused: No       PHYSICAL EXAM:    Vitals: BP (!) 187/95   Pulse 78   Temp 98.8  F (37.1  C) (Oral)   Resp 15   SpO2 100%    Gen:  Alert, awake, NAD, Elderly female with dementia, poor historian.   HENT:  Head atraumatic, normocephalic.   Clear oropharynx.  Dentition intact.   Respiratory:  Normal respiratory rate.  Lungs CTA.  Chest wall stable to compression.  Nontender chest wall.   Trachea midline.  Cardiovascular:  Regular rate and rhythm.  Palpable radial and DP pulses bilaterally.  Abdomen:  Soft, nontender, normoactive bowel sounds.    Musculoskeletal:  No midline C-spine tenderness.  No midline spinal step-offs noted.  FROM in all extremities.  5/5 strength in all extremities.  No gross deformities noted.  Pelvis stable. No tenderness over pelvis/hips, no hip pain, logroll legs, no bony tenderness.   Integument:  No ecchymosis, " abrasions, hematomas, lacerations noted.    Neuro:  GCS 15, A & O x 3, sensation intact to light touch      LAB:  All pertinent labs reviewed and interpreted.  Labs Ordered and Resulted from Time of ED Arrival to Time of ED Departure - No data to display    RADIOLOGY:  XR Sacrum and Coccyx 2 Views   Final Result   IMPRESSION: Normal joint spaces and alignment. No fracture.      XR Pelvis and Hip Bilateral 2 Views   Final Result   IMPRESSION: Normal joint spaces and alignment. No fracture. Previous left hip arthroplasty with components appearing normal.      CT Head w/o Contrast   Final Result   IMPRESSION:   1.  No CT evidence for acute intracranial process.   2.  Brain atrophy and presumed chronic microvascular ischemic changes as above.          EKG:   Performed at: 21:52:02  Impression: Sinus rhythm with premature supraventricular complexes, cannot rule out anterior infarct, age undetermined, abnormal ECG.  Rate: 75  Rhythm: Sinus  QRS Interval: 82  QTc Interval: 451  Comparison: No previous ECGs available  I have independently reviewed and interpreted the EKG(s) documented above.       I, Shabana Love am serving as a scribe to document services personally performed by Dr. Lon Sheppard based on my observation and the provider's statements to me. I, Lon Sheppard M.D. attest that Shabana Love is acting in a scribe capacity, has observed my performance of the services and has documented them in accordance with my direction.      Lon Sheppard M.D.  Emergency Medicine  [facility] EMERGENCY DEPARTMENT  [facility] [facility] [id number] [phone]  Dept: [phone]     Lon Sheppard MD  05/23/24 0422

## 2024-05-23 NOTE — ED NOTES
Writer called [facility] to give report and spoke to KENNEY Nicole. She said they will accept patient back, however, nurse won't be around until 7 in the morning but she will leave her paper works on the nurse's box for them to see it in AM.

## 2024-08-29 PROBLEM — S22.000A THORACIC COMPRESSION FRACTURE, CLOSED, INITIAL ENCOUNTER (H): Status: ACTIVE | Noted: 2024-01-01

## 2024-08-29 PROBLEM — R55 SYNCOPE AND COLLAPSE: Status: ACTIVE | Noted: 2024-01-01

## 2024-08-29 PROBLEM — S32.000A LUMBAR COMPRESSION FRACTURE, CLOSED, INITIAL ENCOUNTER (H): Status: ACTIVE | Noted: 2024-01-01

## 2024-08-29 PROBLEM — L89.90 PRESSURE INJURY OF SKIN, UNSPECIFIED INJURY STAGE, UNSPECIFIED LOCATION: Status: ACTIVE | Noted: 2024-01-01

## 2024-08-29 NOTE — H&P
[facility]    History and Physical - Hospitalist Service       Date of Admission:  8/29/2024    Assessment & Plan    Assessment:   Candace Vick is a 89 year old female with a past medical history documented below presented to the ER after a syncopal episode, patient was sitting and having her breakfast when she passed out for 2 to 3 minutes, did not have a fall or injury and just passed out on the table woke up and immediately had vomiting, history of fall a couple of weeks back and was complaining of some back pain with movement, on physical examination had a sacral wound suspecting most likely pressure injury, imaging was negative for acute process, patient received fluids,  pain control and Zofran in the ED and is going to be admitted for syncope possibly vasovagal and back pain from sacral DU and chronic compression deformities.    Problem list and plan:  Syncope and collapse possibly vasovagal 2/2 dehydration  Nausea and vomiting secondary to above  presented to the ER after a syncopal episode, patient was sitting and having her breakfast when she passed out for 2 to 3 minutes, did not have a fall or injury and just passed out on the table woke up and immediately had vomiting, history of fall a couple of weeks back and was complaining of some back pain with movement, on physical examination had a sacral wound suspecting most likely pressure injury, In the ER vitals were significant for elevated blood pressure, labs significant for mildly elevated creatinine and mild hyperglycemia otherwise labs within normal limits, CT scan of the pelvis did not show any acute process other than renal cyst, CT scan of the thoracic and lumbar spine showed evidence of compression deformities in the thoracic and lumbar vertebrae so MRI of the thoracic and lumbar spine was performed MRI of the lumbar and thoracic spine also showed chronic compression deformities and degenerative changes, patient received  fluids,  pain control and Zofran in the ED and is going to be admitted for syncope possibly vasovagal and back pain from sacral DU and chronic compression deformities.  F/u Echo  Fall precautions and PT and OT eval  Moniter on tele  Neurochecks  Orthostatic vital signs    Back pain secondary to Decubitus ulcer and compression deformities in the thoracic and lumbar vertebrae  Wound care consulted  Imaging shows evidence of chronic compression deformities in the thoracic and lumbar vertebrae, review full imaging for details  Pain control as per protocol  Consider neurosurgery consult    Renal cyst on imaging  Follow-up renal sonogram     Elevated blood pressure without a diagnosis of hypertension  Monitor vital signs as per protocol  IV hydralazine as needed for elevated blood pressure  May add blood pressure medication as appropriate    Mild CATARINO most likely prerenal  Baseline creatinine below 1  Current creatinine 1.01  Gentle IV hydration  Monitor renal function closely    Mild hyperglycemia most likely reactive  Follow-up hemoglobin A1c  Monitor blood sugar level intermittently    History of hypothyroidism  C/w Levothyroxine  F/u TSH    History of dementia  C/w namenda  C/w mirtazapine  C/w Megace    DVT PPx  Intermittent pneumatic compression    CODE STATUS  Full code as CODE STATUS could not be discussed with the patient due to  dementia          Diet: Combination Diet Regular Diet Adult    DVT Prophylaxis: Pneumatic Compression Devices  Pierre Catheter: Not present  Lines: None     Cardiac Monitoring: None  Code Status: Full Code    Mental Status: The patient was alert and awake but pleasantly confused, most of the history was obtained from chart review and discussion with the ER physician  Clinically Significant Risk Factors Present on Admission                                   Disposition Plan     Medically Ready for Discharge: Anticipated in 2-4 Days     Saad J. Gondal, MD  [facility]  [facility]  Securely message with [facility] (more info)  Text page via [employer] Paging/Directory     ______________________________________________________________________    Chief Complaint   Syncope, vomiting, back pain    History is obtained from the patient    History of Present Illness   Candace Vick is a 89 year old female with a past medical history documented below presented to the ER after a syncopal episode, patient was sitting and having her breakfast when she passed out for 2 to 3 minutes, did not have a fall or injury and just passed out on the table woke up and immediately had vomiting, history of fall a couple of weeks back and was complaining of some back pain with movement, on physical examination had a sacral wound suspecting most likely pressure injury, In the ER vitals were significant for elevated blood pressure, labs significant for mildly elevated creatinine and mild hyperglycemia otherwise labs within normal limits, CT scan of the pelvis did not show any acute process other than renal cyst, CT scan of the thoracic and lumbar spine showed evidence of compression deformities in the thoracic and lumbar vertebrae so MRI of the thoracic and lumbar spine was performed MRI of the lumbar and thoracic spine also showed chronic compression deformities and degenerative changes, patient received fluids,  pain control and Zofran in the ED and is going to be admitted for syncope possibly vasovagal and back pain from sacral DU and chronic compression deformities.      Past Medical History    No past medical history on file.    Past Surgical History   No past surgical history on file.    Prior to Admission Medications   Prior to Admission Medications   Prescriptions Last Dose Informant Patient Reported? Taking?   Lidocaine (LIDOCARE) 4 % Patch 8/29/2024 at 8 AM  Yes Yes   Sig: Place 1 patch onto the skin 2 times daily. To prevent lidocaine toxicity, patient should be patch free for 12 hrs daily.    acetaminophen (TYLENOL) 500 MG tablet 8/29/2024 at 8 AM  Yes Yes   Sig: Take 1,000 mg by mouth 2 times daily.   docusate sodium (COLACE) 100 MG capsule Past Month at PRN  Yes Yes   Sig: Take 100 mg by mouth 2 times daily as needed for constipation.   levothyroxine (SYNTHROID/LEVOTHROID) 75 MCG tablet 8/29/2024 at 8 AM  Yes Yes   Sig: Take 75 mcg by mouth daily.   megestrol (MEGACE) 20 MG tablet 8/29/2024 at 8 AM  Yes Yes   Sig: Take 20 mg by mouth 2 times daily.   memantine (NAMENDA) 5 MG tablet 8/29/2024 at 8 AM  Yes Yes   Sig: Take 5 mg by mouth daily.   menthol-zinc oxide (CALMOSEPTINE) 0.44-20.6 % OINT ointment 8/29/2024 at 8 AM  Yes Yes   Sig: Apply topically 2 times daily. to tailbone area   mirtazapine (REMERON) 7.5 MG tablet 8/28/2024 at 8 PM  Yes Yes   Sig: Take 7.5 mg by mouth at bedtime.   polyethylene glycol (MIRALAX) 17 g packet 8/29/2024 at 8 AM  Yes Yes   Sig: Take 1 packet by mouth daily.      Facility-Administered Medications: None        Review of Systems    The 10 point Review of Systems is negative other than noted in the HPI or here.  Syncope, vomiting, back pain    Physical Exam   Vital Signs: Temp: 97.6  F (36.4  C) Temp src: Oral BP: (!) 157/90 Pulse: 74   Resp: 17 SpO2: 97 % O2 Device: None (Room air)    Weight: 140 lbs 0 oz    GENERAL: Patient was seen and examined at bedside with no acute distress, chronically ill-appearing and frail  HENT:  Head is normocephalic, atraumatic. Sunken eyes, pale conjunctival mucosa, dry mouth  EYES:  Eyes have anicteric sclerae without conjunctival injection   LUNGS: Bilateral air entry, clear to auscultation bilaterally  CARDIOVASCULAR:  Regular rate and rhythm with no murmurs, gallops or rubs.  ABDOMEN:  Normal bowel sounds. Soft; nontender   EXT: no edema    SKIN:  No acute rashes.  Dry scaly wrinkled skin, poor skin turgor  NEUROLOGIC:  Grossly nonfocal.      Medical Decision Making       80 MINUTES SPENT BY ME on the date of service doing chart  review, history, exam, documentation & further activities per the note.      Data     I have personally reviewed the following data over the past 24 hrs:    8.2  \   12.8   / 290     139 105 17.0 /  125 (H)   4.4 23 1.01 (H) \       Imaging results reviewed over the past 24 hrs:   Recent Results (from the past 24 hour(s))   CT Thoracic Spine w/o Contrast    Narrative    EXAM: CT THORACIC SPINE W/O CONTRAST, CT LUMBAR SPINE W/O CONTRAST  LOCATION: [facility]  DATE/TIME: 8/29/2024 11:57 AM CDT    INDICATION: Back pain after trauma  COMPARISON: None available at time dictation  TECHNIQUE:   1) Routine CT Thoracic Spine without IV contrast. Multiplanar reformats. Dose reduction techniques were used.   2) Routine CT Lumbar Spine without IV contrast. Multiplanar reformats. Dose reduction techniques were used.     FINDINGS:  THORACIC SPINE CT:  VERTEBRA: The spine is imaged from inferior endplate of C6 through superior endplate of L2. Slightly exaggerated thoracic kyphosis without significant spondylolisthesis. Age-indeterminate compression deformity of these T6 vertebral body (70% height   loss). No aggressive osseous lesion.      CANAL/FORAMINA: Multilevel degenerative changes without high-grade spinal canal stenosis or high-grade neural foraminal narrowing.     PARASPINAL: No prevertebral or paravertebral soft tissue swelling.  Visualized lungs are clear. The thyroid gland is unremarkable. Mild coronary artery calcium. Dense mitral annulus calcification.    LUMBAR SPINE CT:  VERTEBRA: The spine is imaged from inferior endplate of L1 through superior endplate of S3. There are 5 lumbar type vertebral bodies. Straightening of the usual spinal curvature without significant spondylolisthesis. Chronic appearing compression   deformity of the L3 vertebral body without convincing evidence of acute fracture. No aggressive osseous lesion.      CANAL/FORAMINA: Multilevel degenerative changes including  severe spinal canal stenosis and moderate bilateral neural foraminal narrowing at L4-L5 and L5-S1 with moderate.     PARASPINAL: No prevertebral or paravertebral soft tissue swelling. Sigmoid diverticulosis.       Impression    IMPRESSION:    THORACIC SPINE CT:  1. Age indeterminate compression deformity of these T6 vertebral body (70% height loss), which could be better evaluated with dedicated thoracic spine MRI if desired.    LUMBAR SPINE CT:  1. Chronic appearing compression deformity of the L3 vertebral body without convincing evidence of acute fracture.    2. Multilevel degenerative changes including severe spinal canal stenosis and moderate bilateral neural foraminal narrowing at L4-L5 and L5-S1 with moderate.    CT Lumbar Spine w/o Contrast    Narrative    EXAM: CT THORACIC SPINE W/O CONTRAST, CT LUMBAR SPINE W/O CONTRAST  LOCATION: [facility]  DATE/TIME: 8/29/2024 11:57 AM CDT    INDICATION: Back pain after trauma  COMPARISON: None available at time dictation  TECHNIQUE:   1) Routine CT Thoracic Spine without IV contrast. Multiplanar reformats. Dose reduction techniques were used.   2) Routine CT Lumbar Spine without IV contrast. Multiplanar reformats. Dose reduction techniques were used.     FINDINGS:  THORACIC SPINE CT:  VERTEBRA: The spine is imaged from inferior endplate of C6 through superior endplate of L2. Slightly exaggerated thoracic kyphosis without significant spondylolisthesis. Age-indeterminate compression deformity of these T6 vertebral body (70% height   loss). No aggressive osseous lesion.      CANAL/FORAMINA: Multilevel degenerative changes without high-grade spinal canal stenosis or high-grade neural foraminal narrowing.     PARASPINAL: No prevertebral or paravertebral soft tissue swelling.  Visualized lungs are clear. The thyroid gland is unremarkable. Mild coronary artery calcium. Dense mitral annulus calcification.    LUMBAR SPINE CT:  VERTEBRA: The spine is  imaged from inferior endplate of L1 through superior endplate of S3. There are 5 lumbar type vertebral bodies. Straightening of the usual spinal curvature without significant spondylolisthesis. Chronic appearing compression   deformity of the L3 vertebral body without convincing evidence of acute fracture. No aggressive osseous lesion.      CANAL/FORAMINA: Multilevel degenerative changes including severe spinal canal stenosis and moderate bilateral neural foraminal narrowing at L4-L5 and L5-S1 with moderate.     PARASPINAL: No prevertebral or paravertebral soft tissue swelling. Sigmoid diverticulosis.       Impression    IMPRESSION:    THORACIC SPINE CT:  1. Age indeterminate compression deformity of these T6 vertebral body (70% height loss), which could be better evaluated with dedicated thoracic spine MRI if desired.    LUMBAR SPINE CT:  1. Chronic appearing compression deformity of the L3 vertebral body without convincing evidence of acute fracture.    2. Multilevel degenerative changes including severe spinal canal stenosis and moderate bilateral neural foraminal narrowing at L4-L5 and L5-S1 with moderate.    CT Pelvis Bone wo Contrast    Narrative    EXAM: CT PELVIS BONE WO CONTRAST  LOCATION: [facility]  DATE: 8/29/2024    INDICATION: h o repeated falls recently, now has tender over both pubic rami and  has back pain with movement  COMPARISON: None.  TECHNIQUE: CT scan of the pelvis was performed without IV contrast. Multiplanar reformats were obtained. Dose reduction techniques were used.  CONTRAST: None.    FINDINGS:    There is diffuse osseous demineralization. 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 Streak artifact from the hardware degrades evaluation of adjacent   structures.    No acute pelvic fracture is identified. Some lucency along the right greater trochanter is favored to be  due to a small nutrient foramen or enthesopathic irregularity rather than a nondisplaced fracture.    Moderate right hip osteoarthritis. Mild degenerative changes at the sacroiliac joints. There are degenerative changes in the lower lumbar spine which are better evaluated on the concurrent lumbar spine CT from the same day, please see that report for   further details.    The bladder is distended. No pelvic free fluid. Arterial calcifications are noted. There is a partially visualized fluid signal lesion along the right kidney measuring up to 6.3 cm.      Impression    IMPRESSION:  1.  No acute pelvic fracture is identified. Given the degree of osseous demineralization, MRI would be more sensitive for nondisplaced fractures and should be considered if there is persistent clinical concern.  2.  Moderate right hip osteoarthritis. 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  3.  There is a partially visualized fluid signal lesion along the right kidney measuring approximately 6.3 cm, which could represent a renal cyst but is only partially visualized on this study and indeterminate. Correlation with any prior cross-sectional   imaging of the abdomen is recommended.                 Lumbar spine MRI w/o contrast    Narrative    EXAM: MR LUMBAR SPINE W/O CONTRAST  LOCATION: [facility]  DATE: 08/29/2024    INDICATION: Trauma, pain.  COMPARISON: CT 08/29/2024.  TECHNIQUE: Routine Lumbar Spine MRI without IV contrast.    FINDINGS:   NOMENCLATURE: Five lumbar-type vertebral bodies.    ALIGNMENT: Slight L4-L5 and L3-L4 retrolisthesis. Slight L2-L3 anterolisthesis. Moderate broad-based levoconvex curvature.    BONES: Mild to moderate chronic L3 superior endplate compression fracture deformity with no evidence for edema. An L3 compression fracture is described in a plain film report from 08/10/2021, though  those images are not available for direct comparison.   The remaining vertebral bodies are unremarkable in height. Marrow signal is heterogeneous but without specific evidence of a marrow-replacing process. The visualized portions of the sacrum and jet appear intact.    DISCS: Moderate multilevel interbody degenerative change described in further detail below.    SPINAL CORD: The conus terminates at L2. Trace fatty signal along the filum. Otherwise, no convincing signal abnormalities of the imaged cord or cauda equina nerve roots.     SPINAL CANAL: Moderate L4-L5 spinal canal stenosis.    PARASPINAL SOFT TISSUES: No acute abnormality. Paraspinal muscular atrophy.    ABDOMINAL CAVITY: No acute abnormality demonstrated within technique limitations. Cholelithiasis. Presumed bilateral renal cysts.    SIGNIFICANT FINDINGS BY LEVEL:  T12-L1: Normal disc height and signal. No herniation. Normal facets. No spinal canal or neural foraminal stenosis.     L1-L2: Normal disc height and signal. No herniation. Normal facets. No spinal canal or neural foraminal stenosis.    L2-L3: Moderate intervertebral disc height loss. Disc desiccation. Shallow bulge. Mild facet arthropathy. Mild spinal canal stenosis. Moderate right lateral recess stenosis. Mild to moderate right and mild left neural foraminal stenosis.     L3-L4: Moderate intervertebral disc height loss. Disc desiccation. Broad-based bulge. Moderate right and mild to moderate left facet arthropathy. Ligamentum flavum thickening. Mild to moderate spinal canal stenosis. Moderate right lateral recess   stenosis. Mild to moderate right and no significant left neural foraminal stenosis.    L4-L5: Advanced intervertebral disc height loss. Disc desiccation. Interbody spurring. Moderate facet arthropathy. Ligamentum flavum thickening. Moderate spinal canal stenosis. Moderate to severe right lateral recess stenosis encroaching upon the   descending right L5 nerve root. Moderate right  and moderate to severe left neural foraminal stenosis.    L5-S1: Mild intervertebral disc height loss. Disc desiccation. Shallow bulge. Left-sided far lateral disc-osteophyte complex. Moderate right and moderate left facet arthropathy. Ligamentum flavum thickening. Mild to moderate spinal canal stenosis.   Moderate to severe bilateral lateral recess stenosis encroaching upon the descending S1 nerve roots. Mild bilateral neural foraminal stenosis.      Impression    IMPRESSION:  1.  Chronic L3 superior endplate compression fracture deformity.  2.  No evidence for acute fracture.  3.  Multilevel degenerative change as above.   Thoracic spine MRI w/o contrast    Narrative    EXAM: MR THORACIC SPINE W/O CONTRAST  LOCATION: [facility]  DATE: 8/29/2024    INDICATION: Back pain after fall. Compression deformity of T6 and L3 on CT. ? chronic. 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  COMPARISON: CT 8/29/2024.  TECHNIQUE: Routine Thoracic Spine MRI without IV contrast.    FINDINGS:     ALIGNMENT: Exaggerated thoracic kyphosis centered at T6-T7. No significant spondylolisthesis. Mild broad-based dextroconvex thoracic curvature.    BONES: Moderate T6 wedge compression fracture deformity again demonstrated. There is trace edema along the inferior endplate. This is favored to represent a subacute fracture or a chronic fracture with Modic I degenerative endplate signal change. The   remaining vertebral bodies are unremarkable in height. No findings to suggest a marrow-replacing process. Subtle Modic I signal change along the T7 superior endplate. Nonspecific STIR-hyperintense signal of the left T8 transverse process, without   convincing evidence of displaced fracture on the comparison CT.    DISCS: Mild spondylosis. No large posterior disc abnormalities. Shallow broad-based right paracentral disc protrusion at T6-T7.    SPINAL CORD: No convincing signal abnormalities.    SPINAL CANAL: No high-grade stenosis.  No findings to suggest epidural hematoma.    NEURAL FORAMINA: No high-grade stenosis.    PARASPINAL SOFT TISSUES: Unremarkable.    THORACIC CAVITY: Shallow left pleural effusion.      Impression    IMPRESSION:  1.  Moderate T6 wedge compression fracture deformity with trace edema. The pattern favors either a late subacute fracture or a chronic fracture with Modic I degenerative endplate signal change.  2.  Nonspecific T2/STIR-hyperintense signal abnormality of the left T8 transverse process without convincing evidence of fracture on CT. This is nonspecific and may benefit from follow-up imaging.  3.  Mild degenerative change without high-grade stenosis of the spinal canal or neural foramina.

## 2024-08-29 NOTE — ED PROVIDER NOTES
Emergency Department Encounter      NAME: Candace Vick  AGE: 89 year old female  YOB: 1935  MRN: [id number]  EVALUATION DATE & TIME: 8/29/2024 10:43 AM    PCP: Helen Craven    ED PROVIDER: Lon Sheppard M.D.      Chief Complaint   Patient presents with    Syncope         FINAL IMPRESSION:  1. Syncope and collapse    2. Lumbar compression fracture, closed, initial encounter (H)    3. Thoracic compression fracture, closed, initial encounter (H)    4. Pressure injury of skin, unspecified injury stage, unspecified location        MEDICAL DECISION MAKING:    10:55 AM I met with the patient, obtained history, performed an initial exam, and discussed options and plan for diagnostics and treatment here in the ED.   1:25 PM Paged hospitalist.  1:49 PM Discussed with Dr. Gondal, hospitalist, who accepts patient for admission.     ED Course as of 08/29/24 1707   Thu Aug 29, 2024   1229 Half milligram of IV Dilaudid was ordered for the patient's back pain.  She was noted to have a superficial decubitus ulcer on her buttocks when the nursing staff changed her.     This patient is a 89-year-old female with history of dementia who was brought in by ambulance because of a syncopal episode that happened while she was eating breakfast.  The staff stated that she fell forward onto the table and was unconscious for 2 to 3 minutes.  She threw up 1 time after waking up.  Only complaint was of lower back pain while in the ER.  I did review the patient's medical records and I saw her on 22 May after a fall.  A head CT done at that time was negative.  The patient received a dose of Zofran for nausea and half milligrams of Dilaudid IV for her pain.  I did a thoracic and lumbar spine CT which showed compression deformities of T6 and L3.  She had some minimal tenderness of the pubic rami and so a pelvis CT was done but it did not show any acute fractures.  I independently reviewed and interpreted the CT.  An EKG was done  because of the syncope and it showed nonspecific T wave abnormality and heart rate at 80 bpm but no significant change when compared with the prior EKG from 22 May 2024.  I independently reviewed and interpreted the EKG.  I admitted the patient to the hospitalist service for continued cardiac monitoring as at this point she is a full code.  Additionally MRIs of the lumbar and thoracic spine were done to assess whether the compression deformity seen at CT were chronic or acute/subacute.    Pertinent Labs & Imaging studies reviewed. (See chart for details)    The importance of close follow up was discussed. We reviewed warning signs and symptoms, and I instructed Ms. Vick to return to the emergency department immediately if she develops any new or worsening symptoms. I provided additional verbal discharge instructions. Ms. Vick expressed understanding and agreement with this plan of care, her questions were answered, and she was discharged in stable condition.     Medical Decision Making     History:  Supplemental history from: Documented in chart, if applicable  External Record(s) reviewed: Documented in chart, if applicable.     Work Up:  Chart documentation includes differential considered and any EKGs or imaging independently interpreted by provider, where specified.  In additional to work up documented, I considered the following work up: Documented in chart, if applicable.     External consultation:  Discussion of management with another provider: Documented in chart, if applicable     Complicating factors:  Care impacted by chronic illness: Dementia  Care affected by social determinants of health: Access to Medical Care     Disposition considerations: Admit      MEDICATIONS GIVEN IN THE EMERGENCY:  Medications   ondansetron (ZOFRAN) injection 4 mg (4 mg Intravenous $Given 8/29/24 1148)   sodium chloride 0.9% BOLUS 500 mL (0 mLs Intravenous Stopped 8/29/24 1236)   HYDROmorphone (PF) (DILAUDID) injection 0.5 mg  "(0.5 mg Intravenous $Given 8/29/24 1245)   sodium chloride 0.9% BOLUS 500 mL (500 mLs Intravenous $New Bag 8/29/24 1418)       NEW PRESCRIPTIONS STARTED AT TODAY'S ER VISIT:  New Prescriptions    No medications on file          =================================================================    [facility]    Patient information was obtained from: patient and RN   Patient is a poor historian due to dementia    Use of : N/A       Candace Vick is a 89 year old female with a past medical history of dementia, COPD, and hypothyroidism, who presents after syncopal episode and complains of low back pain.    Per nurse, patient was eating breakfast when staff noticed a syncopal episode, lasted 2-3 minutes. No fall or injuries. After waking, patient had 1 episode of vomiting. Patient says \"ow\" every time she is moved.     Per chart review, patient was seen at [facility] ED on 5/22/2024 for evaluation after an unwitnessed fall. Head CT negative. Sacrum and coccyx x-ray negative for fracture. Pelvic x-ray and bilateral hip x-rays negative for fracture. EKG done and showed no sign of MI or ischemia.     REVIEW OF SYSTEMS   Review of Systems   As stated in HPI    PAST MEDICAL HISTORY:  No past medical history on file.    PAST SURGICAL HISTORY:  No past surgical history on file.    CURRENT MEDICATIONS:    No current facility-administered medications for this encounter.    Current Outpatient Medications:     acetaminophen (TYLENOL) 500 MG tablet, Take 1,000 mg by mouth 2 times daily., Disp: , Rfl:     docusate sodium (COLACE) 100 MG capsule, Take 100 mg by mouth 2 times daily as needed for constipation., Disp: , Rfl:     levothyroxine (SYNTHROID/LEVOTHROID) 75 MCG tablet, Take 75 mcg by mouth daily., Disp: , Rfl:     Lidocaine (LIDOCARE) 4 % Patch, Place 1 patch onto the skin 2 times daily. To prevent lidocaine toxicity, patient should be patch free for 12 hrs daily., Disp: , Rfl:     megestrol (MEGACE) 20 MG tablet, Take 20 mg " "by mouth 2 times daily., Disp: , Rfl:     memantine (NAMENDA) 5 MG tablet, Take 5 mg by mouth daily., Disp: , Rfl:     menthol-zinc oxide (CALMOSEPTINE) 0.44-20.6 % OINT ointment, Apply topically 2 times daily. to tailbone area, Disp: , Rfl:     mirtazapine (REMERON) 7.5 MG tablet, Take 7.5 mg by mouth at bedtime., Disp: , Rfl:     polyethylene glycol (MIRALAX) 17 g packet, Take 1 packet by mouth daily., Disp: , Rfl:     ALLERGIES:  Allergies   Allergen Reactions    Statins Other (See Comments)     Sensitivity to high doses       FAMILY HISTORY:  No family history on file.    SOCIAL HISTORY:   Social History     Socioeconomic History    Marital status:      Social Determinants of Health     Stress: No Stress Concern Present (5/11/2021)    Received from [facility]    [facility] of Occupational Health - Occupational Stress Questionnaire     Feeling of Stress : Not at all   Interpersonal Safety: Not At Risk (5/11/2021)    Received from [facility]    Humiliation, Afraid, Rape, and Kick questionnaire     Fear of Current or Ex-Partner: No     Emotionally Abused: No     Physically Abused: No     Sexually Abused: No       PHYSICAL EXAM:    Vitals: BP (!) 157/90   Pulse 74   Temp 97.6  F (36.4  C) (Oral)   Resp 17   Ht 1.803 m (5' 11\")   Wt 63.5 kg (140 lb)   SpO2 97%   BMI 19.53 kg/m     Constitutional: Elderly female, poor historian, complains of lower back pain that worsens with movement.  HEAD:Normocephalic, atraumatic,   Eyes: PERRLA, EOM intact, conjunctiva clear, no discharge  ENT: mucous membranes moist, nose normal.   Neck- Supple, gross ROM intact.  No JVD.  No palpable nodes.  Pulmonary: Clear to auscultation bilaterally, no respiratory distress, no wheezing, speaks full sentences easily.  Cardiovascular: Normal heart rate, regular rhythm, no murmurs. No lower extremity edema, 2+ DP pulses.   GI: Soft to deep palpation in all quadrants, not distended, no masses.  No hepatosplenomegaly. Mild " tenderness over pubic rami. No tenderness in hips.   Musculoskeletal: Moving all 4 extremities intentionally and without pain. No obvious deformity. No other bony tenderness.  Back: No CVA tenderness  Skin: Warm, dry, no rash.  Neurologic: Alert & oriented x 3, speech clear, moving all extremities spontaneously   Psychiatric: The patient is not oriented.  Poor historian.     LAB:  All pertinent labs reviewed and interpreted.  Labs Ordered and Resulted from Time of ED Arrival to Time of ED Departure   BASIC METABOLIC PANEL - Abnormal       Result Value    Sodium 139      Potassium 4.4      Chloride 105      Carbon Dioxide (CO2) 23      Anion Gap 11      Urea Nitrogen 17.0      Creatinine 1.01 (*)     GFR Estimate 54 (*)     Calcium 9.4      Glucose 125 (*)    CBC WITH PLATELETS AND DIFFERENTIAL    WBC Count 8.2      RBC Count 3.92      Hemoglobin 12.8      Hematocrit 39.1            MCH 32.7      MCHC 32.7      RDW 12.9      Platelet Count 290      % Neutrophils 80      % Lymphocytes 12      % Monocytes 5      % Eosinophils 1      % Basophils 1      % Immature Granulocytes 0      NRBCs per 100 WBC 0      Absolute Neutrophils 6.6      Absolute Lymphocytes 1.0      Absolute Monocytes 0.4      Absolute Eosinophils 0.1      Absolute Basophils 0.1      Absolute Immature Granulocytes 0.0      Absolute NRBCs 0.0     ROUTINE UA WITH MICROSCOPIC REFLEX TO CULTURE       RADIOLOGY:  Thoracic spine MRI w/o contrast   Final Result   IMPRESSION:   1.  Moderate T6 wedge compression fracture deformity with trace edema. The pattern favors either a late subacute fracture or a chronic fracture with Modic I degenerative endplate signal change.   2.  Nonspecific T2/STIR-hyperintense signal abnormality of the left T8 transverse process without convincing evidence of fracture on CT. This is nonspecific and may benefit from follow-up imaging.   3.  Mild degenerative change without high-grade stenosis of the spinal canal or neural  foramina.      Lumbar spine MRI w/o contrast   Final Result   IMPRESSION:   1.  Chronic L3 superior endplate compression fracture deformity.   2.  No evidence for acute fracture.   3.  Multilevel degenerative change as above.      CT Lumbar Spine w/o Contrast   Final Result   IMPRESSION:      THORACIC SPINE CT:   1. Age indeterminate compression deformity of these T6 vertebral body (70% height loss), which could be better evaluated with dedicated thoracic spine MRI if desired.      LUMBAR SPINE CT:   1. Chronic appearing compression deformity of the L3 vertebral body without convincing evidence of acute fracture.      2. Multilevel degenerative changes including severe spinal canal stenosis and moderate bilateral neural foraminal narrowing at L4-L5 and L5-S1 with moderate.       CT Pelvis Bone wo Contrast   Final Result   IMPRESSION:   1.  No acute pelvic fracture is identified. Given the degree of osseous demineralization, MRI would be more sensitive for nondisplaced fractures and should be considered if there is persistent clinical concern.   2.  Moderate right hip osteoarthritis. 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   3.  There is a partially visualized fluid signal lesion along the right kidney measuring approximately 6.3 cm, which could represent a renal cyst but is only partially visualized on this study and indeterminate. Correlation with any prior cross-sectional    imaging of the abdomen is recommended.                         CT Thoracic Spine w/o Contrast   Final Result   IMPRESSION:      THORACIC SPINE CT:   1. Age indeterminate compression deformity of these T6 vertebral body (70% height loss), which could be better evaluated with dedicated thoracic spine MRI if desired.      LUMBAR SPINE CT:   1. Chronic appearing compression deformity of the L3 vertebral body without convincing evidence of acute  fracture.      2. Multilevel degenerative changes including severe spinal canal stenosis and moderate bilateral neural foraminal narrowing at L4-L5 and L5-S1 with moderate.           EKG:   Performed at: 10:50 AM  Impression: Nonspecific T wave abnormality. Abnormal ECG.   Rate: 80  Rhythm: Sinus rhythm with premature atrial complexes  QRS Interval: 78  QTc Interval: 463  Comparison: When compared with ECG of 22-May-2024, no significant change was found.    I have independently reviewed and interpreted the EKG(s) documented above.         I, Karin Knapp, am serving as a scribe to document services personally performed by Dr. Lon Sheppard based on my observation and the provider's statements to me. I, Lon Sheppard M.D. attest that Karin Knapp is acting in a scribe capacity, has observed my performance of the services and has documented them in accordance with my direction.      Lon Sheppard M.D.  Emergency Medicine  [facility] EMERGENCY DEPARTMENT  [facility] [facility] [address]  [phone]  Dept: [phone]     Lon Sheppard MD  08/29/24 6386

## 2024-08-29 NOTE — ED NOTES
Verbal handoff report given to receiving RNColeen. All questions and concerns addressed at this time.

## 2024-08-29 NOTE — ED NOTES
"[facility] ED Handoff Report    ED Chief Complaint: syncopal episode without injury    ED Diagnosis:  (R55) Syncope and collapse    (S32.000A) Lumbar compression fracture, closed, initial encounter (H)    (S22.000A) Thoracic compression fracture, closed, initial encounter (H)    (L89.90) Pressure injury of skin, unspecified injury stage, unspecified location       PMH:  No past medical history on file.     Code Status:  No Order     Falls Risk: Yes Band: Applied    Current Living Situation/Residence: lives in a skilled nursing facility     Elimination Status: Continent: No     Activity Level: 2 assist    Patients Preferred Language:  English     Needed: No    Vital Signs:  BP (!) 157/90   Pulse 74   Temp 97.6  F (36.4  C) (Oral)   Resp 17   Ht 1.803 m (5' 11\")   Wt 63.5 kg (140 lb)   SpO2 97%   BMI 19.53 kg/m       Cardiac Rhythm: 70s    Pain Score: Patient is unable to quantify.    Is the Patient Confused:  Yes    Last Food or Drink: 08/29/24 at breakfast    Focused Assessment:  The patient arrives via [facility] [place] [facility]. Pt was sitting at the table after breakfast and staff witnessed a syncopal episode lasting 2-3 minutes. Pt head down, no fall, no injuroes. Pt awoke and had an epiod eof vomiting. Pt is alert x self only baseline, Pt fell a couple of weeks ago and has c/o back pain. Pt stating owe with movement. Rn noted pain with movement.     Patient had 1x episode of extreme agitation which resolved after IV pain medication dose.     Tests Performed: Done: Labs and Imaging    Treatments Provided:  see MAR    Family Dynamics/Concerns: No    Family Updated On Visitor Policy: Yes    Plan of Care Communicated to Family: Yes    Who Was Updated about Plan of Care: son Vasile Tidwell Checklist Done and Signed by Patient: Yes    Medications sent with patient: none    Covid: asymptomatic     Additional Information: none    RN: Theresa Harp RN 8/29/2024 " 4:35 PM

## 2024-08-29 NOTE — ED NOTES
Patient calling out in pain after being changed. Cannot specify location. Mepilex placed on patient sacrum as there is a wound present from sacrum down to anus. Provider notified.

## 2024-08-29 NOTE — ED NOTES
Bed: [place] [address]  Expected date:   Expected time:   Means of arrival:   Comments:  Nicolas OVALLE Syncope

## 2024-08-29 NOTE — PHARMACY-ADMISSION MEDICATION HISTORY
Pharmacist Admission Medication History    Admission medication history is complete. The information provided in this note is only as accurate as the sources available at the time of the update.    Information Source(s): Facility ([facility]/) medication list/MAR via phone    Pertinent Information: None    Changes made to PTA medication list:  Added: acetaminophen, Calmoseptine, levothyroxine, lidocaine, megestrol, memantine,   Deleted: None  Changed: None    Allergies reviewed with patient and updates made in EHR: no    Medication History Completed By: Tara Reinoso RPH 8/29/2024 1:44 PM    PTA Med List   Medication Sig Last Dose    acetaminophen (TYLENOL) 500 MG tablet Take 1,000 mg by mouth 2 times daily. 8/29/2024 at 8 AM    docusate sodium (COLACE) 100 MG capsule Take 100 mg by mouth 2 times daily as needed for constipation. Past Month at PRN    levothyroxine (SYNTHROID/LEVOTHROID) 75 MCG tablet Take 75 mcg by mouth daily. 8/29/2024 at 8 AM    Lidocaine (LIDOCARE) 4 % Patch Place 1 patch onto the skin 2 times daily. To prevent lidocaine toxicity, patient should be patch free for 12 hrs daily. 8/29/2024 at 8 AM    megestrol (MEGACE) 20 MG tablet Take 20 mg by mouth 2 times daily. 8/29/2024 at 8 AM    memantine (NAMENDA) 5 MG tablet Take 5 mg by mouth daily. 8/29/2024 at 8 AM    menthol-zinc oxide (CALMOSEPTINE) 0.44-20.6 % OINT ointment Apply topically 2 times daily. to tailbone area 8/29/2024 at 8 AM    mirtazapine (REMERON) 7.5 MG tablet Take 7.5 mg by mouth at bedtime. 8/28/2024 at 8 PM    polyethylene glycol (MIRALAX) 17 g packet Take 1 packet by mouth daily. 8/29/2024 at 8 AM

## 2024-08-29 NOTE — CONSULTS
Care Management Initial Consult    General Information  Assessment completed with: Caregiver,    Type of CM/SW Visit: Initial Assessment    Primary Care Provider verified and updated as needed:     Readmission within the last 30 days:           Advance Care Planning:            Communication Assessment  Patient's communication style: spoken language (English or Bilingual)             Cognitive  Cognitive/Neuro/Behavioral: .WDL except  Level of Consciousness: confused  Arousal Level: opens eyes spontaneously  Orientation: disoriented to, place, time, situation  Mood/Behavior: anxious     Speech: spontaneous    Living Environment:   People in home: facility resident     Current living Arrangements:        Able to return to prior arrangements:         Family/Social Support:  Care provided by:    Provides care for:       Support system:            Description of Support System:           Current Resources:   Patient receiving home care services:          Community Resources:    Equipment currently used at home:    Supplies currently used at home:      Employment/Financial:  Employment Status:          Financial Concerns:             Does the patient's insurance plan have a 3 day qualifying hospital stay waiver?  No    Lifestyle & Psychosocial Needs:  Social Determinants of Health     Food Insecurity: Not on file   Depression: Not at risk (8/3/2023)    Received from [facility]    PHQ-2     Patient Health Questionnaire-2 Score: 0   Housing Stability: Not on file   Tobacco Use: Low Risk  (8/3/2023)    Received from [facility]    Patient History     Smoking Tobacco Use: Never     Smokeless Tobacco Use: Never     Passive Exposure: Not on file   Financial Resource Strain: Not on file   Alcohol Use: Not on file   Transportation Needs: Not on file   Physical Activity: Not on file   Interpersonal Safety: Not At Risk (5/11/2021)    Received from [facility]    Humiliation, Afraid, Rape, and Kick questionnaire     Fear of Current or  Ex-Partner: No     Emotionally Abused: No     Physically Abused: No     Sexually Abused: No   Stress: No Stress Concern Present (5/11/2021)    Received from [facility]    [employer] [place] of Occupational Health - Occupational Stress Questionnaire     Feeling of Stress : Not at all   Social Connections: Not on file   Health Literacy: Not on file       Functional Status:  Prior to admission patient needed assistance:   Dependent ADLs:: Ambulation-walker, Bathing, Dressing, Grooming, Incontinence, Positioning, Transfers  Dependent IADLs:: Cleaning, Cooking, Laundry, Shopping, Meal Preparation, Medication Management, Money Management, Transportation, Incontinence       Mental Health Status:          Chemical Dependency Status:                Values/Beliefs:  Spiritual, Cultural Beliefs, Oriental orthodox Practices, Values that affect care:                 Discussed  Partnership in Safe Discharge Planning  document with patient/family: No    Additional Information:  RNCM placed phone call to patient's facility. Initial assessment completed.     Patient lives in Memory Care, at baseline is able to follow commands. Assist of 1 with ADLs, uses a walker with mobility.     RNCM placed call to patient's son, due to cognition, completed DEGROOT via phone.     Next Steps: Awaiting work up/ medical stability.    Lita Bonilla RN

## 2024-08-30 NOTE — CONSULTS
[facility]  WOC Nurse Inpatient Assessment     Consulted for: sacrum    Summary: shear related wound on sacrum    Patient History (according to provider note(s):          Assessment:      Areas visualized during today's visit: Focused:    Wound location: Sacrum    Last photo: NA  Wound due to: Shear  Wound history/plan of care: unknown  Wound base: 100% pink epithelial with peeling epithelial surrounding, suggestive of shear injury. No open areas noted at time of assessment     Drainage: none     Description of drainage: none     Tunneling: N/A     Undermining: N/A  Periwound skin: dark, slow to lisa pinkness/erythema  Pain interventions prior to dressing change: patient tolerated well  Treatment goal: Heal   STATUS: initial assessment  Supplies ordered: supplies stored on unit       Treatment Plan:     Sacral wound(s): Every 3 days  Cleanse with saline, pat dry  Cover with sacral mepilex  Lift to assess underneath mepilex daily     Orders: Written    RECOMMEND PRIMARY TEAM ORDER: None, at this time  Education provided: plan of care  Discussed plan of care with: Patient and Nurse  WOC nurse follow-up plan: weekly  Notify WOC if wound(s) deteriorate.  Nursing to notify the Provider(s) and re-consult the WOC Nurse if new skin concern.    DATA:     Current support surface: Standard  Standard gel mattress (Isoflex)  Containment of urine/stool: Incontinence Protocol and Brief  BMI: Body mass index is 19.53 kg/m .   Active diet order: Orders Placed This Encounter      Combination Diet Regular Diet Adult     Output: I/O last 3 completed shifts:  In: 435 [P.O.:435]  Out: -      Labs:   Recent Labs   Lab 08/30/24  0625 08/29/24  1108   ALBUMIN 3.7  --    HGB 12.9 12.8   WBC 6.2 8.2   A1C  --  5.3     Pressure injury risk assessment:   Sensory Perception: 3-->slightly limited  Moisture: 3-->occasionally moist  Activity: 2-->chairfast  Mobility: 3-->slightly limited  Nutrition: 2-->probably  inadequate  Friction and Shear: 2-->potential problem  Rene Score: 15    MIKE RAMIREZ RN CWOCN, CFCN  Pager no longer is use, please contact through [employer]   Vocera group: [facility]

## 2024-08-30 NOTE — PLAN OF CARE
PRIMARY DIAGNOSIS: SYNCOPE  OUTPATIENT/OBSERVATION GOALS TO BE MET BEFORE DISCHARGE:  1. Orthostatic performed: Yes:             2. Diagnostic testing complete & at baseline neurologic testing:     3. Cleared by consultants (if involved): No    4. Interpretation of cardiac rhythm per telemetry tech: NA    5. Tolerating adequate PO diet and medications: Yes    6. Return to near baseline physical activity or neurologic status: No    Discharge Planner Nurse   Safe discharge environment identified: No  Barriers to discharge: Yes       Entered by: Zoë De Leon RN 08/30/2024 2:01 PM     Please review provider order for any additional goals.   Nurse to notify provider when observation goals have been met and patient is ready for discharge.Goal Outcome Evaluation:    Patient is alert to self only.  Calls out with back pain with movement. Scheduled pain meds given. Ate lunch.  Fluids encouraged.  Worked with PT and OT, up in chair with assist of 1.   Continue plan of care.

## 2024-08-30 NOTE — PLAN OF CARE
"PRIMARY DIAGNOSIS: \"GENERIC\" NURSING  OUTPATIENT/OBSERVATION GOALS TO BE MET BEFORE DISCHARGE:  ADLs back to baseline: No    Activity and level of assistance: Up with maximum assistance. Consider SW and/or PT evaluation.     Pain status: Improved-controlled with oral pain medications.    Return to near baseline physical activity: No     Discharge Planner Nurse   Safe discharge environment identified: No  Barriers to discharge: Yes       Entered by: Akash Morgan RN 08/30/2024 7:06 AM     Please review provider order for any additional goals.   Nurse to notify provider when observation goals have been met and patient is ready for discharge.Goal Outcome Evaluation:                        "

## 2024-08-30 NOTE — PLAN OF CARE
PRIMARY DIAGNOSIS: SYNCOPE/TIA  OUTPATIENT/OBSERVATION GOALS TO BE MET BEFORE DISCHARGE:  1. Orthostatic performed: Yes:      2. Diagnostic testing complete & at baseline neurologic testing    3. Cleared by consultants (if involved): No    4. Interpretation of cardiac rhythm per telemetry tech: NSR    5. Tolerating adequate PO diet and medications: Yes    6. Return to near baseline physical activity or neurologic status: No    Discharge Planner Nurse   Safe discharge environment identified: Yes  Barriers to discharge: Yes       Entered by: Zoë De Leon RN 08/30/2024 12:45 PM     Please review provider order for any additional goals.   Nurse to notify provider when observation goals have been met and patient is ready for discharge.Goal Outcome Evaluation:    Patient is alert to self only.  Ate breakfast, up to bedside commode with assist of 2 to urinate.  Called out with back pain with movement.  Scheduled pain meds given.   Appears comfortable in bed.  Tele NSR. Continue plan of care.

## 2024-08-30 NOTE — PROGRESS NOTES
"   08/30/24 4051   Appointment Info   Signing Clinician's Name / Credentials (PT) Monkia An, PT, DPT   Quick Adds   Quick Adds Certification   Living Environment   People in Home facility resident   Current Living Arrangements assisted living  (Memory care)   Home Accessibility no concerns   Self-Care   Equipment Currently Used at Home walker, rolling   Fall history within last six months yes   Activity/Exercise/Self-Care Comment Unclear PLOF - pt unable to give history   General Information   Onset of Illness/Injury or Date of Surgery 08/29/24   Referring Physician Gondal, Saad J, MD   Patient/Family Therapy Goals Statement (PT) Return to Home   Pertinent History of Current Problem (include personal factors and/or comorbidities that impact the POC) Per Chart Review -\"89 year old female with a past medical history documented below presented to the ER after a syncopal episode, patient was sitting and having her breakfast when she passed out for 2 to 3 minutes, did not have a fall or injury and just passed out on the table woke up and immediately had vomiting, history of fall a couple of weeks back and was complaining of some back pain with movement, on physical examination had a sacral wound suspecting most likely pressure injury, imaging was negative for acute process, patient received fluids,  pain control and Zofran in the ED and is going to be admitted for syncope possibly vasovagal and back pain from sacral DU and chronic compression deformities.\"   Existing Precautions/Restrictions fall;spinal   Weight-Bearing Status - LLE weight-bearing as tolerated   Weight-Bearing Status - RLE weight-bearing as tolerated   Range of Motion (ROM)   Range of Motion ROM is WFL   Strength (Manual Muscle Testing)   Strength (Manual Muscle Testing) Deficits observed during functional mobility   Bed Mobility   Bed Mobility supine-sit   Supine-Sit [place] (Bed Mobility) minimum assist (75% patient effort);2 person assist " "  Transfers   Transfers sit-stand transfer   Sit-Stand Transfer   Sit-Stand [place] (Transfers) minimum assist (75% patient effort);2 person assist   Gait/Stairs (Locomotion)   [place] Level (Gait) supervision;verbal cues;minimum assist (75% patient effort)   Assistive Device (Gait) walker, front-wheeled   Distance in Feet (Gait) 2   Pattern (Gait) step-to;swing-to   Deviations/Abnormal Patterns (Gait) fady decreased;gait speed decreased   Maintains Weight-bearing Status (Gait) able to maintain   Clinical Impression   Criteria for Skilled Therapeutic Intervention Yes, treatment indicated   PT Diagnosis (PT) Impaired Functional Mobility   Influenced by the following impairments weakness, decreased ROM, impaired balance   Functional limitations due to impairments gait, transfers   Clinical Presentation (PT Evaluation Complexity) stable   Clinical Presentation Rationale pt presents as medically diagnosed   Clinical Decision Making (Complexity) low complexity   Planned Therapy Interventions (PT) balance training;bed mobility training;gait training;home exercise program;neuromuscular re-education;strengthening;transfer training;home program guidelines   Risk & Benefits of therapy have been explained evaluation/treatment results reviewed;patient   PT Total Evaluation Time   PT Cas, Low Complexity Minutes (21060) 10   Therapy Certification   Start of care date 08/30/24   Certification date from 08/30/24   Certification date to 09/06/24   Medical Diagnosis Per Chart Review -\"89 year old female with a past medical history documented below presented to the ER after a syncopal episode, patient was sitting and having her breakfast when she passed out for 2 to 3 minutes, did not have a fall or injury and just passed out on the table woke up and immediately had vomiting, history of fall a couple of weeks back and was complaining of some back pain with movement, on physical examination had a sacral wound suspecting " "most likely pressure injury, imaging was negative for acute process, patient received fluids, pain control and Zofran in the ED and is going to be admitted for syncope possibly vasovagal and back pain from sacral DU and chronic compression deformities.\"   Physical Therapy Goals   PT Frequency 5x/week   PT Predicted Duration/Target Date for Goal Attainment 09/05/24   PT Goals Transfers;Bed Mobility;Gait   PT: Bed Mobility Supervision/stand-by assist;Supine to/from sit;Within precautions   PT: Transfers Supervision/stand-by assist;Sit to/from stand;Assistive device;Within precautions   PT: Gait Supervision/stand-by assist;Rolling walker;50 feet   Interventions   Interventions Quick Adds Therapeutic Activity;Therapeutic Procedure   Therapeutic Procedure/Exercise   Ther. Procedure: strength, endurance, ROM, flexibillity Minutes (83151) 13   Symptoms Noted During/After Treatment fatigue   Treatment Detail/Skilled Intervention Seated & reclined LE therex: 8-10 reps, cueing for safety/technique/specific muscle activation, visual demos for technique - intermittent SBA to CGA required. increased cueing for attention to task. seated rest break   Therapeutic Activity   Therapeutic Activities: dynamic activities to improve functional performance Minutes (47699) 10   Symptoms Noted During/After Treatment Fatigue   Treatment Detail/Skilled Intervention Scooting fwd EOB: cueing for safety/technique, Mod A; Sitting balance EOB: cueing for safety/posture, SBA to CGA; pt ambulated around bed to chair (10'): cueing for safety/technique/FWW management - CGA to [place] for walker management; transfer stand to sitting in recliner: cueing for safety/technique/backing up to chair/hand placement on stable surface - increased time for cueing, CGA to Min A; Education/review log roll & spinal precautions-repeated cues/difficulty following. pt reported increased nausea - RN notified & requested BP to be taken 136/76 - RN notified.   PT Discharge " "Planning   PT Plan gait as vanessa with chair follow, LE Therex, bed mobility - log roll technique   PT Discharge Recommendation (DC Rec) home with assist;home with home care physical therapy  (Return to Memory care with Home PT)   PT Rationale for DC Rec 24/7 supervision & assist x1 available at [facility], anticipate pt can return home to memory care. Home PT for further progressing strength/balance/activity tolerance   PT Brief overview of current status CGA to Min A sit to/from stand transfers & gait with FWW 10'; Min A bed mobility   PT Equipment Needed at Discharge walker, rolling   Total Session Time   Timed Code Treatment Minutes 23   Total Session Time (sum of timed and untimed services) 33     [facility]  OUTPATIENT PHYSICAL THERAPY EVALUATION  PLAN OF TREATMENT FOR OUTPATIENT REHABILITATION  (COMPLETE FOR INITIAL CLAIMS ONLY)  Patient's Last Name, First Name, M.I.  YOB: 1935  Candace Vick                        Provider's Name  [facility] Medical Record No.  [id number]                             Onset Date:  08/29/24   Start of Care Date:  08/30/24   Type:     _X_PT   ___OT   ___SLP Medical Diagnosis:  Per Chart Review -\"89 year old female with a past medical history documented below presented to the ER after a syncopal episode, patient was sitting and having her breakfast when she passed out for 2 to 3 minutes, did not have a fall or injury and just passed out on the table woke up and immediately had vomiting, history of fall a couple of weeks back and was complaining of some back pain with movement, on physical examination had a sacral wound suspecting most likely pressure injury, imaging was negative for acute process, patient received fluids, pain control and Zofran in the ED and is going to be admitted for syncope possibly vasovagal and back pain from sacral DU and chronic compression deformities.\"              PT Diagnosis:  " Impaired Functional Mobility Visits from SOC:  1     See note for plan of treatment, functional goals and certification details    I CERTIFY THE NEED FOR THESE SERVICES FURNISHED UNDER        THIS PLAN OF TREATMENT AND WHILE UNDER MY CARE     (Physician co-signature of this document indicates review and certification of the therapy plan).

## 2024-08-30 NOTE — DISCHARGE INSTRUCTIONS
Wound care instructions:  Sacral wound(s): Every 3 days  Cleanse with saline, pat dry  Cover with sacral mepilex

## 2024-08-30 NOTE — PROGRESS NOTES
[facility]      OUTPATIENT OCCUPATIONAL THERAPY  EVALUATION  PLAN OF TREATMENT FOR OUTPATIENT REHABILITATION  (COMPLETE FOR INITIAL CLAIMS ONLY)  Patient's Last Name, First Name, M.I.  YOB: 1935  Candace Vick                          Provider's Name  [facility] Medical Record No.  [id number]                               Onset Date:  08/29/24   Start of Care Date:        Type:     ___PT   _X_OT   ___SLP Medical Diagnosis:                           OT Diagnosis:  Impaired ADL independence   Visits from SOC:  1   _________________________________________________________________________________  Plan of Treatment/Functional Goals    Planned Interventions: ADL retraining, balance training, transfer training   Goals: See Occupational Therapy Goals on Care Plan in [employer] electronic health record.    Therapy Frequency: 4 times/week  Predicted Duration of Therapy Intervention: 09/06/24  _________________________________________________________________________________    I CERTIFY THE NEED FOR THESE SERVICES FURNISHED UNDER        THIS PLAN OF TREATMENT AND WHILE UNDER MY CARE .             Physician Signature               Date    X_____________________________________________________                   ,      Referring Physician: Gondal            Initial Assessment        See Occupational Therapy evaluation dated   in Epic electronic health record.                   08/30/24 1330   Appointment Info   Signing Clinician's Name / Credentials (OT) Yumiko Martínez/L   Living Environment   People in Home facility resident   Current Living Arrangements other (see comments)  (nenirt care)   Self-Care   Current Activity Tolerance fair   Equipment Currently Used at Home walker, rolling   Fall history within last six months yes   Activity/Exercise/Self-Care Comment  Pt is unable to give history   General Information   Onset of Illness/Injury or Date of Surgery 08/29/24   Referring Physician Gondal   Patient/Family Therapy Goal Statement (OT) none stated   Additional Occupational Profile Info/Pertinent History of Current Problem Pt admitted s/p syncope and collapse, back pain, lumbar and thoracic compression deformities, h/o dementia   Existing Precautions/Restrictions fall;spinal   Cognitive Status Examination   Cognitive Status Comments impaired at baseline, dementia   Visual Perception   Visual Impairment/Limitations WFL   Sensory   Sensory Quick Adds sensation intact   Range of Motion Comprehensive   General Range of Motion no range of motion deficits identified   Strength Comprehensive (MMT)   Comment, General Manual Muscle Testing (MMT) Assessment generalized weakness   Coordination   Upper Extremity Coordination No deficits were identified   Bed Mobility   Comment (Bed Mobility) Min A   Transfers   Transfer Comments Min A   Balance   Balance Comments Min A   Activities of Daily Living   BADL Assessment/Intervention lower body dressing   Lower Body Dressing Assessment/Training   Comment, (Lower Body Dressing) Max A   Clinical Impression   Criteria for Skilled Therapeutic Interventions Met (OT) Yes, treatment indicated   OT Diagnosis Impaired ADL independence   OT Problem List-Impairments impacting ADL activity tolerance impaired;balance;cognition;pain;mobility   Assessment of Occupational Performance 3-5 Performance Deficits   Planned Therapy Interventions (OT) ADL retraining;balance training;transfer training   Risk & Benefits of therapy have been explained evaluation/treatment results reviewed;participants included;patient   Clinical Impression Comments Pt seen bedside for OT eval and treatment.  Pt demonstrates decreased independence with ADLs and mobiilty as well as impaired cognition.  OT to continue to address.  Recommend return to memory care with OT vs TCU pending  level of assist memory care can provide.   OT Total Evaluation Time   OT Eval, Moderate Complexity Minutes (68802) 10   OT Goals   Therapy Frequency (OT) 4 times/week   OT Predicted Duration/Target Date for Goal Attainment 09/06/24   OT Goals Transfers;Toilet Transfer/Toileting;Hygiene/Grooming   OT: Hygiene/Grooming minimal assist   OT: Transfer Supervision/stand-by assist;with assistive device   OT: Toilet Transfer/Toileting Minimal assist   OT Discharge Planning   OT Plan toileting, transfers, G/H   OT Discharge Recommendation (DC Rec) other (see comments);home with home care occupational therapy  (return to memory care)   OT Rationale for DC Rec Recommend return to memory care with therapy vs TCU pending level of assist memory care can provide.  Pt will need Min/Mod A with all ADLs.   OT Brief overview of current status Min/Mod A with ADLS, Min A iwth mobiltiy

## 2024-08-30 NOTE — PLAN OF CARE
Problem: Dementia Signs/Symptoms  Goal: Improved Behavioral Control (Dementia Signs/Symptoms)  Outcome: Not Progressing     Problem: Syncope  Goal: Absence of Syncopal Symptoms  Outcome: Progressing     Problem: Pain Acute  Goal: Optimal Pain Control and Function  Outcome: Progressing     Goal Outcome Evaluation:       Pt presented to the hospital today after experiencing a syncopal episode at her memory care facility. On telemetry monitoring. Cardiac rhythm is normal sinus. Pt refuses to comply with neuro assessment. A&O to self. Speech is illogical. Difficult assist of 2 with transfers using cues. Urinated on bedside commode.  Pt is uncooperative with cares. Hx of fall two weeks ago with persistent back pain. Imaging shows chronic T6 and L3 compression fractures. Received prn Oxycodone 2.5 mg for pain management. BP has been elevated. Appetite is poor. IV fluids were running at 100 ml/hr of NS however access was lost and attempts to place a new peripheral  IV have been unsuccessful. Creatinine 1.0. Drank approximately 240 ml of fluid with encouragement. Renal ultrasound performed and shows renal cysts. Mepilex on sacrum for pressure area. WOC consult  in place.

## 2024-08-30 NOTE — PLAN OF CARE
"PRIMARY DIAGNOSIS: \"GENERIC\" NURSING  OUTPATIENT/OBSERVATION GOALS TO BE MET BEFORE DISCHARGE:  ADLs back to baseline: No    Activity and level of assistance: Up with maximum assistance. Consider SW and/or PT evaluation.     Pain status: Improved-controlled with oral pain medications.    Return to near baseline physical activity: No     Discharge Planner Nurse   Safe discharge environment identified: No  Barriers to discharge: Yes       Entered by: Akash Morgan RN 08/30/2024 7:40 AM     Please review provider order for any additional goals.   Nurse to notify provider when observation goals have been met and patient is ready for discharge.Goal Outcome Evaluation:  Patient continues to be resistive with cares. Uncooperative with neuro assessment. Up to the BSC with max assist of 2 and voided large amount. Attempted to encourage po fluids.                         " Chief Complaint  Diabetes, Hypertension, and Heartburn    Subjective            Scott Mcgregor presents to [facility]  History of Present Illness  Pt is a f/u for DM2, HTN, and GERD. No issues or concerns at this time.     Pt is followed by [facility] for DM.    Pt is due colon screening/orders place.    Pt is due DM foot exam/ will perform today.    Pt is due DM eye exam. Pt was given form to self schedule    Pt is due shingles vaccine. Pt declines and understands the risks of not having.         Past Medical History:   Diagnosis Date    Diabetes type 2, controlled     Enlarged prostate     Hyperlipidemia     Hypertension        No Known Allergies     Past Surgical History:   Procedure Laterality Date    COLONOSCOPY  2014    repeat in 10 yrs    EYE SURGERY  2004    PRK    HAND SURGERY  1981    KNEE SURGERY  1978        Social History     Tobacco Use    Smoking status: Never    Smokeless tobacco: Never   Substance Use Topics    Alcohol use: Never       Family History   Problem Relation Age of Onset    Stroke Other     Heart disease Other     Hypertension Other     Diabetes Other         Current Outpatient Medications on File Prior to Visit   Medication Sig    Jardiance 25 MG tablet tablet Take 1 tablet by mouth Daily.    Trulicity 3 MG/0.5ML solution pen-injector Inject 0.5 mL under the skin into the appropriate area as directed Every 7 (Seven) Days for 180 days.    [DISCONTINUED] aspirin 81 MG EC tablet Take 1 tablet by mouth Daily.    [DISCONTINUED] hydroCHLOROthiazide (HYDRODIURIL) 25 MG tablet Take 1 tablet by mouth Daily.    [DISCONTINUED] lisinopril (PRINIVIL,ZESTRIL) 40 MG tablet Take 1 tablet by mouth Daily.    [DISCONTINUED] omeprazole (priLOSEC) 40 MG capsule Take 1 capsule by mouth Daily.    [DISCONTINUED] Continuous Blood Gluc Sensor (FreeStyle Brandyn 3 Sensor) [facility] Use 1 each Every 14 (Fourteen) Days. (Patient not taking: Reported on 1/29/2024)     No current  "facility-administered medications on file prior to visit.       Health Maintenance Due   Topic Date Due    DIABETIC FOOT EXAM  09/19/2023    DIABETIC EYE EXAM  12/19/2023    COLORECTAL CANCER SCREENING  01/01/2024       Objective     /82   Pulse 82   Ht 162.6 cm (64\")   Wt 81.2 kg (179 lb)   SpO2 95%   BMI 30.73 kg/m²       Physical Exam  Constitutional:       General: He is not in acute distress.     Appearance: Normal appearance. He is not ill-appearing.   HENT:      Head: Normocephalic and atraumatic.      Right Ear: Tympanic membrane, ear canal and external ear normal.      Left Ear: Tympanic membrane, ear canal and external ear normal.      Nose: Nose normal.   Cardiovascular:      Rate and Rhythm: Normal rate and regular rhythm.      Pulses:           Dorsalis pedis pulses are 2+ on the right side and 2+ on the left side.      Heart sounds: Normal heart sounds. No murmur heard.  Pulmonary:      Effort: Pulmonary effort is normal. No respiratory distress.      Breath sounds: Normal breath sounds.   Chest:      Chest wall: No tenderness.   Abdominal:      General: Abdomen is flat. Bowel sounds are normal. There is no distension.      Palpations: Abdomen is soft. There is no mass.      Tenderness: There is no abdominal tenderness. There is no guarding.   Musculoskeletal:         General: No swelling or tenderness. Normal range of motion.      Cervical back: Normal range of motion and neck supple.   Feet:      Right foot:      Protective Sensation: 3 sites tested.  3 sites sensed.      Skin integrity: Skin integrity normal. No ulcer or blister.      Toenail Condition: Right toenails are normal.      Left foot:      Protective Sensation: 3 sites tested.  3 sites sensed.      Skin integrity: Skin integrity normal. No ulcer or blister.      Toenail Condition: Left toenails are normal.      Comments:      Skin:     General: Skin is warm and dry.      Findings: No rash.   Neurological:      General: No focal " deficit present.      Mental Status: He is alert and oriented to person, place, and time. Mental status is at baseline.      Gait: Gait normal.   Psychiatric:         Mood and Affect: Mood normal.         Behavior: Behavior normal.         Thought Content: Thought content normal.         Judgment: Judgment normal.       Monofilament Test Performed    Result Review :                           Assessment and Plan        Diagnoses and all orders for this visit:    1. Essential hypertension (Primary)  Comments:  stable on lisinopril 40mg and HCTZ 25mg, continue  Orders:  -     lisinopril (PRINIVIL,ZESTRIL) 40 MG tablet; Take 1 tablet by mouth Daily.  Dispense: 90 tablet; Refill: 1  -     hydroCHLOROthiazide (HYDRODIURIL) 25 MG tablet; Take 1 tablet by mouth Daily.  Dispense: 90 tablet; Refill: 1  -     aspirin 81 MG EC tablet; Take 1 tablet by mouth Daily.  Dispense: 90 tablet; Refill: 1    2. Screening for colon cancer  -     Ambulatory Referral For Screening Colonoscopy    3. Type 2 diabetes mellitus without complication, without long-term current use of insulin  Comments:  stable on Jardiance 25mg and Trulicity, conitnue, continue to f/u with [facility] for DM management    4. Gastroesophageal reflux disease without esophagitis  Comments:  stable on prilosec 40mg, continue  Orders:  -     omeprazole (priLOSEC) 40 MG capsule; Take 1 capsule by mouth Daily.  Dispense: 90 capsule; Refill: 1              Follow Up     Return in about 6 months (around 7/29/2024).    Patient was given instructions and counseling regarding his condition or for health maintenance advice. Please see specific information pulled into the AVS if appropriate.     Scott Balbir  reports that he has never smoked. He has never used smokeless tobacco..

## 2024-08-30 NOTE — PROGRESS NOTES
Care Management Follow Up    Length of Stay (days): 0    Expected Discharge Date: 08/31/2024     Concerns to be Addressed: discharge planning     Patient plan of care discussed at interdisciplinary rounds: Yes    Anticipated Discharge Disposition: Assisted Living              Anticipated Discharge Services: None  Anticipated Discharge DME:      Patient/family educated on Medicare website which has current facility and service quality ratings: no  Education Provided on the Discharge Plan: Yes  Patient/Family in Agreement with the Plan: yes    Referrals Placed by CM/SW:    Private pay costs discussed: Not applicable    Discussed  Partnership in Safe Discharge Planning  document with patient/family: No     Handoff Completed: No, handoff not indicated or clinically appropriate    Additional Information:  2:31 PM  SW called and spoke to the [facility] DON Chio regarding Pt potentially being ready to discharge back to the [facility] tomorrow. Chio reported that Pt's return should not be an issue unless there are any new medication changes that are made. Chio reported that Pt's family has Pt scheduled to sign onto hospice services with [facility] at 0900 on Thursday, September 5th. Chio reported that CM can contact her cell over the weekend if Pt is ready to discharge back home.    Contacts:  [facility] - Assisted Living [facility]  Main Office: [phone]  Fax: [phone]  Chio, director of nursing  Cell: [phone]    Next Steps: CM will continue to follow Pt's medical progression for Care Team recommendations and assist with discharge planning as needed.      LINDEN Haro

## 2024-08-30 NOTE — PROGRESS NOTES
[facility]    Medicine Progress Note - Hospitalist Service    Date of Admission:  8/29/2024    Assessment & Plan   89-year-old with severe dementia who had a syncope episode while having breakfast for 2 to 3 minutes.  No seizure activity found.  Patient was found to have dehydration and CATARINO on admission which resolved after IV fluids.  Patient cannot give any reliable information.  Neurosurgery consulted for T6 fracture but was deemed as chronic.  Family updated with likely discharge next a.m.     Syncope likely combination of vasovagal and dehydration  --Patient would need to drink at least 4 glasses 8 ounces of water a day  -- Echo done shows EF of 60 to 65%.      Asymptomatic bacteriuria  -- No treatment necessary    CATARINO on admission but now resolved  -- DC IV fluids    T6 fracture likely chronic  -- Neurosurgery consulted  -- No intervention planned    Simple renal cyst on renal ultrasound  -- No intervention needed    History of hypothyroidism--controlled  TSH   Date Value Ref Range Status   08/29/2024 1.57 0.30 - 4.20 uIU/mL Final       Dementia  --Continue home medications      Diet: Combination Diet Regular Diet Adult    DVT Prophylaxis: Low Risk/Ambulatory with no VTE prophylaxis indicated  Pierre Catheter: Not present  Lines: None     Cardiac Monitoring: None  Code Status: Full Code      Clinically Significant Risk Factors Present on Admission                                           Disposition Plan     Medically Ready for Discharge: Anticipated Tomorrow             Rafa Harvey MD  Hospitalist Service  [facility]  Securely message with [employer] (more info)  Text page via [employer] Paging/Directory   ______________________________________________________________________    Interval History   Patient new to me.  Chart reviewed.  Patient cannot give reliable information due to severe dementia.  Neurosurgery consulted for T6 compression fracture.  This is deemed  to be chronic as per neurosurgery and no treatment necessary.  No family at bedside.    Physical Exam   Vital Signs: Temp: 97.9  F (36.6  C) Temp src: Oral BP: (!) 143/74 Pulse: 77   Resp: 20 SpO2: 98 % O2 Device: None (Room air)    Weight: 140 lbs 0 oz  Patient is confused and cannot give reliable information    Medical Decision Making       35 MINUTES SPENT BY ME on the date of service doing chart review, history, exam, documentation & further activities per the note.  MANAGEMENT DISCUSSED with the following over the past 24 hours: Patient's son and daughter-in-law   NOTE(S)/MEDICAL RECORDS REVIEWED over the past 24 hours: H&P       Data     I have personally reviewed the following data over the past 24 hrs:    6.2  \   12.9   / 238     140 107 15.3 /  76   3.8 21 (L) 0.82 \     ALT: 10 AST: 21 AP: 91 TBILI: 0.5   ALB: 3.7 TOT PROTEIN: 6.8 LIPASE: N/A     TSH: N/A T4: N/A A1C: N/A       Imaging results reviewed over the past 24 hrs:   Recent Results (from the past 24 hour(s))   Lumbar spine MRI w/o contrast    Narrative    EXAM: MR LUMBAR SPINE W/O CONTRAST  LOCATION: [facility]  DATE: 08/29/2024    INDICATION: Trauma, pain.  COMPARISON: CT 08/29/2024.  TECHNIQUE: Routine Lumbar Spine MRI without IV contrast.    FINDINGS:   NOMENCLATURE: Five lumbar-type vertebral bodies.    ALIGNMENT: Slight L4-L5 and L3-L4 retrolisthesis. Slight L2-L3 anterolisthesis. Moderate broad-based levoconvex curvature.    BONES: Mild to moderate chronic L3 superior endplate compression fracture deformity with no evidence for edema. An L3 compression fracture is described in a plain film report from 08/10/2021, though those images are not available for direct comparison.   The remaining vertebral bodies are unremarkable in height. Marrow signal is heterogeneous but without specific evidence of a marrow-replacing process. The visualized portions of the sacrum and jet appear intact.    DISCS: Moderate multilevel  interbody degenerative change described in further detail below.    SPINAL CORD: The conus terminates at L2. Trace fatty signal along the filum. Otherwise, no convincing signal abnormalities of the imaged cord or cauda equina nerve roots.     SPINAL CANAL: Moderate L4-L5 spinal canal stenosis.    PARASPINAL SOFT TISSUES: No acute abnormality. Paraspinal muscular atrophy.    ABDOMINAL CAVITY: No acute abnormality demonstrated within technique limitations. Cholelithiasis. Presumed bilateral renal cysts.    SIGNIFICANT FINDINGS BY LEVEL:  T12-L1: Normal disc height and signal. No herniation. Normal facets. No spinal canal or neural foraminal stenosis.     L1-L2: Normal disc height and signal. No herniation. Normal facets. No spinal canal or neural foraminal stenosis.    L2-L3: Moderate intervertebral disc height loss. Disc desiccation. Shallow bulge. Mild facet arthropathy. Mild spinal canal stenosis. Moderate right lateral recess stenosis. Mild to moderate right and mild left neural foraminal stenosis.     L3-L4: Moderate intervertebral disc height loss. Disc desiccation. Broad-based bulge. Moderate right and mild to moderate left facet arthropathy. Ligamentum flavum thickening. Mild to moderate spinal canal stenosis. Moderate right lateral recess   stenosis. Mild to moderate right and no significant left neural foraminal stenosis.    L4-L5: Advanced intervertebral disc height loss. Disc desiccation. Interbody spurring. Moderate facet arthropathy. Ligamentum flavum thickening. Moderate spinal canal stenosis. Moderate to severe right lateral recess stenosis encroaching upon the   descending right L5 nerve root. Moderate right and moderate to severe left neural foraminal stenosis.    L5-S1: Mild intervertebral disc height loss. Disc desiccation. Shallow bulge. Left-sided far lateral disc-osteophyte complex. Moderate right and moderate left facet arthropathy. Ligamentum flavum thickening. Mild to moderate spinal canal  stenosis.   Moderate to severe bilateral lateral recess stenosis encroaching upon the descending S1 nerve roots. Mild bilateral neural foraminal stenosis.      Impression    IMPRESSION:  1.  Chronic L3 superior endplate compression fracture deformity.  2.  No evidence for acute fracture.  3.  Multilevel degenerative change as above.   Thoracic spine MRI w/o contrast    Narrative    EXAM: MR THORACIC SPINE W/O CONTRAST  LOCATION: [facility]  DATE: 8/29/2024    INDICATION: Back pain after fall. Compression deformity of T6 and L3 on CT. ? chronic. 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  COMPARISON: CT 8/29/2024.  TECHNIQUE: Routine Thoracic Spine MRI without IV contrast.    FINDINGS:     ALIGNMENT: Exaggerated thoracic kyphosis centered at T6-T7. No significant spondylolisthesis. Mild broad-based dextroconvex thoracic curvature.    BONES: Moderate T6 wedge compression fracture deformity again demonstrated. There is trace edema along the inferior endplate. This is favored to represent a subacute fracture or a chronic fracture with Modic I degenerative endplate signal change. The   remaining vertebral bodies are unremarkable in height. No findings to suggest a marrow-replacing process. Subtle Modic I signal change along the T7 superior endplate. Nonspecific STIR-hyperintense signal of the left T8 transverse process, without   convincing evidence of displaced fracture on the comparison CT.    DISCS: Mild spondylosis. No large posterior disc abnormalities. Shallow broad-based right paracentral disc protrusion at T6-T7.    SPINAL CORD: No convincing signal abnormalities.    SPINAL CANAL: No high-grade stenosis. No findings to suggest epidural hematoma.    NEURAL FORAMINA: No high-grade stenosis.    PARASPINAL SOFT TISSUES: Unremarkable.    THORACIC CAVITY: Shallow left pleural effusion.      Impression    IMPRESSION:  1.  Moderate T6 wedge compression fracture deformity with trace edema. The pattern  favors either a late subacute fracture or a chronic fracture with Modic I degenerative endplate signal change.  2.  Nonspecific T2/STIR-hyperintense signal abnormality of the left T8 transverse process without convincing evidence of fracture on CT. This is nonspecific and may benefit from follow-up imaging.  3.  Mild degenerative change without high-grade stenosis of the spinal canal or neural foramina.   US Renal Complete Non-Vascular    Narrative    EXAM: US RENAL COMPLETE NON-VASCULAR  LOCATION: [facility]  DATE: 2024    INDICATION: renal cyst  COMPARISON: CT pelvis the same day.  TECHNIQUE: Routine Bilateral Renal and Bladder Ultrasound.    FINDINGS:  Technically limited examination due to patient mental status and limited positioning.    RIGHT KIDNEY: 10.6 cm. Normal without hydronephrosis or masses. Simple appearing right lower pole renal cyst measuring up to 6.1 cm.    LEFT KIDNEY: 8.7 cm. Limited evaluation of the left kidney due to overlying shadowing. No hydronephrosis. Simple appearing cyst in the upper pole measures up to 1.8 cm.     BLADDER: Minimally distended and poorly evaluated.      Impression    IMPRESSION:  1.  Technically limited examination as above.  2.  Simple appearing bilateral renal cysts. No dedicated imaging follow-up is required.  3.  No hydronephrosis.   Echocardiogram Complete   Result Value    LVEF  60-65%    Narrative    [id number]  [id number]  [id number]  [id number]^GONDAL^AILYN^[place] [address]     Name: EDDIE CURRY  MRN: [id number]  : 1935  Study Date: 2024 10:27 AM  Age: 89 yrs  Gender: Female  Patient Location: [facility]  Reason For Study: Syncope and Collapse  Ordering Physician: GONDAL, SAAD J  Performed By: GABI     BSA: 1.8 m2  Height: 71 in  Weight: 140 lb  HR: 84  ______________________________________________________________________________  Procedure  Complete Portable Echo  Adult.  ______________________________________________________________________________  Interpretation Summary     Left ventricular size, wall motion and function are normal. The ejection  fraction is 60-65%.  Diastolic Doppler findings (E/E' ratio and/or other parameters) suggest left  ventricular filling pressures are increased.  Normal right ventricle size and systolic function.  The left atrium is mild to moderately dilated.  There is mild (1+) mitral regurgitation.  There is mild (1+) tricuspid regurgitation.  The right ventricular systolic pressure is approximated at 38 mmHg.     No previous study for comparison.     .     ______________________________________________________________________________  I      WMSI = 1.00     % Normal = 100     X - Cannot   0 -                      (2) - Mildly 2 -          Segments  Size  Interpret    Hyperkinetic 1 - Normal  Hypokinetic  Hypokinetic  1-2     small                                                     7 -          3-5      moderate  3 - Akinetic 4 -          5 -         6 - Akinetic Dyskinetic   6-14    large               Dyskinetic   Aneurysmal  w/scar       w/scar       15-16   diffuse     Left Ventricle  Left ventricular size, wall motion and function are normal. The ejection  fraction is 60-65%. There is normal left ventricular wall thickness. Diastolic  Doppler findings (E/E' ratio and/or other parameters) suggest left ventricular  filling pressures are increased. No regional wall motion abnormalities noted.     Right Ventricle  Normal right ventricle size and systolic function.     Atria  The left atrium is mild to moderately dilated. Right atrial size is normal.  There is no atrial shunt seen.     Mitral Valve  The mitral valve leaflets appear thickened, but open well. There is mild  mitral annular calcification. There is mild (1+) mitral regurgitation. There  is no mitral valve stenosis.     Tricuspid Valve  Tricuspid leaflets are thickened. There is  mild (1+) tricuspid regurgitation.  The right ventricular systolic pressure is approximated at 30mmHg plus the  right atrial pressure. There is no tricuspid stenosis.     Aortic Valve  The aortic valve is trileaflet with aortic valve sclerosis. No aortic  regurgitation is present. No aortic stenosis is present.     Pulmonic Valve  The pulmonic valve is not well seen, but is grossly normal.     Vessels  The aorta root is normal. IVC diameter and respiratory changes fall into an  intermediate range suggesting an RA pressure of 8 mmHg.     Pericardium  There is no pericardial effusion.     ______________________________________________________________________________  MMode/2D Measurements & Calculations  IVSd: 1.1 cm  LVIDd: 3.8 cm  LVIDs: 2.4 cm  LVPWd: 1.2 cm  FS: 37.5 %  LV mass(C)d: 146.2 grams  LV mass(C)dI: 80.7 grams/m2  Ao root diam: 2.9 cm     asc Aorta Diam: 2.3 cm  LVOT diam: 1.9 cm  LVOT area: 2.8 cm2  Ao root diam index Ht(cm/m): 1.6  Ao root diam index BSA (cm/m2): 1.6  Asc Ao diam index BSA (cm/m2): 1.3  Asc Ao diam index Ht(cm/m): 1.3  EF Biplane: 58.0 %  LA Volume (BP): 60.3 ml  LA Volume Index (BP): 33.3 ml/m2     LA Volume Indexed (AL/bp): 36.5 ml/m2  RWT: 0.65  TAPSE: 2.3 cm     Doppler Measurements & Calculations  MV E max geronimo: 118.0 cm/sec  MV A max geronimo: 109.0 cm/sec  MV E/A: 1.1  MV max P.2 mmHg  MV mean PG: 3.0 mmHg  MV V2 VTI: 31.3 cm  MVA(VTI): 1.6 cm2  MV P1/2t max geronimo: 120.0 cm/sec  MV P1/2t: 77.0 msec  MVA(P1/2t): 2.9 cm2  MV dec slope: 456.5 cm/sec2  MV dec time: 0.26 sec  Ao V2 max: 160.0 cm/sec  Ao max PG: 10.0 mmHg  Ao V2 mean: 114.0 cm/sec  Ao mean P.0 mmHg  Ao V2 VTI: 31.8 cm  ELY(I,D): 1.5 cm2  ELY(V,D): 1.7 cm2  LV V1 max PG: 3.5 mmHg  LV V1 max: 94.2 cm/sec  LV V1 VTI: 17.3 cm     SV(LVOT): 49.1 ml  SI(LVOT): 27.1 ml/m2  PA V2 max: 87.3 cm/sec  PA max PG: 3.0 mmHg  PA acc time: 0.11 sec  TR max geronimo: 274.0 cm/sec  TR max P.0 mmHg  AV Geronimo Ratio (DI): 0.59  ELY Index  (cm2/m2): 0.85  E/E' av.8  Lateral E/e': 17.0  Medial E/e': 18.7  RV S Geronimo: 14.7 cm/sec     ______________________________________________________________________________  Report approved by: Ignacio Frank 2024 02:23 PM

## 2024-08-30 NOTE — CONSULTS
Neurosurgery Consultation    Date of Service:  08/30/2024  Date of Admission:  8/29/2024  Hospital Day: 2    Assessment/Plan  Candace Vick is a 89 year old female with a past medical history of dementia admitted on 8/29/2024 for evaluation after syncopal episode.  Age-indeterminate T6 fracture seen on CT.  MRI demonstrating this is subacute to chronic.      Neuro  # Chronic T6 compression fracture  -No neurosurgical intervention warranted at this time  -Patient is a poor historian and appears to be asymptomatic  -No outpatient follow-up required for chronic compression fracture  -Neurosurgery to sign off        Clinically Significant Risk Factors Present on Admission                                        TIME SPENT ON THIS ENCOUNTER   I spent 50 minutes of time on the unit/floor managing the care of Candace Vick excluding time performing procedures. I have personally reviewed the following data/imaging over the past 24 hours.     Ham Fields, DEBRA  [address] [address]    Tel [phone]    History of Present Illness:  Candace Vick is a 89 year old female with a past medical history of dementia admitted on 8/29/2024 for evaluation after syncopal episode.  Age-indeterminate T6 fracture seen on CT.  MRI demonstrating this is subacute to chronic.    Patient is a poor historian secondary to dementia diagnosis.  She is only oriented to self.  Per chart review patient syncopal episode prior to coming into the ED.  She was sitting having breakfast when she passed out for 2 to 3 minutes.  She did not have a fall or an injury she just passed out and woke up on the table.  Given the lack of acute findings there were no immediate need for any intervention or need for outpatient follow-up from the neurosurgical team.    Allergies   Allergen Reactions    Statins Other (See Comments)     Sensitivity to high doses       Current Medications:  Current  Facility-Administered Medications   Medication Dose Route Frequency Provider Last Rate Last Admin    acetaminophen (TYLENOL) tablet 1,000 mg  1,000 mg Oral BID Gondal, Saad J, MD   1,000 mg at 08/30/24 0922    levothyroxine (SYNTHROID/LEVOTHROID) tablet 75 mcg  75 mcg Oral Daily Gondal, Saad J, MD   75 mcg at 08/30/24 0922    megestrol (MEGACE) tablet 20 mg  20 mg Oral BID Gondal, Saad J, MD   20 mg at 08/30/24 0923    memantine (NAMENDA) tablet 5 mg  5 mg Oral Daily Gondal, Saad J, MD   5 mg at 08/30/24 0922    mirtazapine (REMERON) tablet TABS 7.5 mg  7.5 mg Oral At Bedtime Gondal, Saad J, MD   7.5 mg at 08/29/24 2131    polyethylene glycol (MIRALAX) Packet 17 g  17 g Oral Daily Gondal, Saad J, MD   17 g at 08/30/24 0922    sodium chloride (PF) 0.9% PF flush 3 mL  3 mL Intracatheter Q8H Gondal, Saad J, MD   3 mL at 08/29/24 1750       PRN Medications:  Current Facility-Administered Medications   Medication Dose Route Frequency Provider Last Rate Last Admin    calcium carbonate (TUMS) chewable tablet 1,000 mg  1,000 mg Oral 4x Daily PRN Gondal, Saad J, MD        hydrALAZINE (APRESOLINE) tablet 10 mg  10 mg Oral Q4H PRN Gondal, Saad J, MD        Or    hydrALAZINE (APRESOLINE) injection 10 mg  10 mg Intravenous Q4H PRN Gondal, Saad J, MD        lidocaine (LMX4) cream   Topical Q1H PRN Gondal, Saad J, MD        lidocaine 1 % 0.1-1 mL  0.1-1 mL Other Q1H PRN Gondal, Saad J, MD        naloxone (NARCAN) injection 0.2 mg  0.2 mg Intravenous Q2 Min PRN Rafa Harvey MD        Or    naloxone (NARCAN) injection 0.4 mg  0.4 mg Intravenous Q2 Min PRN Rafa Harvey MD        Or    naloxone (NARCAN) injection 0.2 mg  0.2 mg Intramuscular Q2 Min PRN Rafa Harvey MD        Or    naloxone (NARCAN) injection 0.4 mg  0.4 mg Intramuscular Q2 Min PRN Rafa Harvey MD        ondansetron (ZOFRAN ODT) ODT tab 4 mg  4 mg Oral Q6H PRN Gondal, Saad J, MD        Or    ondansetron (ZOFRAN) injection 4 mg  4 mg Intravenous Q6H PRN Gondal,  "Jesus MCDANIEL MD        oxyCODONE (ROXICODONE) tablet 5 mg  5 mg Oral Q4H PRN Gondal, Saad J, MD        oxyCODONE IR (ROXICODONE) half-tab 2.5 mg  2.5 mg Oral Q4H PRN Gondal, Saad J, MD   2.5 mg at 08/29/24 2103    senna-docusate (SENOKOT-S/PERICOLACE) 8.6-50 MG per tablet 1 tablet  1 tablet Oral BID PRN Gondal, Saad J, MD        Or    senna-docusate (SENOKOT-S/PERICOLACE) 8.6-50 MG per tablet 2 tablet  2 tablet Oral BID PRN Gondal, Saad J, MD        sodium chloride (PF) 0.9% PF flush 3 mL  3 mL Intracatheter q1 min prn Gondal, Saad J, MD           Infusions:  Current Facility-Administered Medications   Medication Dose Route Frequency Provider Last Rate Last Admin       Physical Examination:  Vitals: BP (!) (P) 177/84 (BP Location: Right arm)   Pulse (P) 85   Temp (P) 98.4  F (36.9  C) (Oral)   Resp (P) 20   Ht 1.803 m (5' 11\")   Wt 63.5 kg (140 lb)   SpO2 (P) 97%   BMI 19.53 kg/m    General: Adult female patient, lying in bed  HEENT: Normocephalic, atraumatic, no icterus, oral cavity/oropharynx pink and moist  Cardiac: Adequately perfused  Pulm: Unlabored, expansion symmetric, no retractions or use of accessory muscles  Abdomen: Soft, non-distended  Extremities: Warm, no edema, distal pulses +2, well perfused  Skin: Decubitus ulcer  Psych: Calm and cooperative  Neuro:  Patient is alert and oriented to self only.  Movement and all 4 extremities.  She denies any back pain, radicular symptoms, or paresthesias.    Labs and Imaging:    Results for orders placed or performed during the hospital encounter of 08/29/24 (from the past 24 hour(s))   [place] Draw    Narrative    The following orders were created for panel order [place] Draw.  Procedure                               Abnormality         Status                     ---------                               -----------         ------                     Extra Blue Top Tube[[id number]]                              Final result               Extra Red Top " Tube[[id number]]                               Final result               Extra Green Top (Lithium...[[id number]]                      Final result               Extra Purple Top Tube[[id number]]                            Final result                 Please view results for these tests on the individual orders.   Extra Blue Top Tube   Result Value Ref Range    Hold Specimen JIC    Extra Red Top Tube   Result Value Ref Range    Hold Specimen JIC    Extra Green Top (Lithium Heparin) Tube   Result Value Ref Range    Hold Specimen JIC    Extra Purple Top Tube   Result Value Ref Range    Hold Specimen JIC    CBC with platelets differential    Narrative    The following orders were created for panel order CBC with platelets differential.  Procedure                               Abnormality         Status                     ---------                               -----------         ------                     CBC with platelets and d...[[id number]]                      Final result                 Please view results for these tests on the individual orders.   Basic metabolic panel   Result Value Ref Range    Sodium 139 135 - 145 mmol/L    Potassium 4.4 3.4 - 5.3 mmol/L    Chloride 105 98 - 107 mmol/L    Carbon Dioxide (CO2) 23 22 - 29 mmol/L    Anion Gap 11 7 - 15 mmol/L    Urea Nitrogen 17.0 8.0 - 23.0 mg/dL    Creatinine 1.01 (H) 0.51 - 0.95 mg/dL    GFR Estimate 54 (L) >60 mL/min/1.73m2    Calcium 9.4 8.8 - 10.4 mg/dL    Glucose 125 (H) 70 - 99 mg/dL   CBC with platelets and differential   Result Value Ref Range    WBC Count 8.2 4.0 - 11.0 10e3/uL    RBC Count 3.92 3.80 - 5.20 10e6/uL    Hemoglobin 12.8 11.7 - 15.7 g/dL    Hematocrit 39.1 35.0 - 47.0 %     78 - 100 fL    MCH 32.7 26.5 - 33.0 pg    MCHC 32.7 31.5 - 36.5 g/dL    RDW 12.9 10.0 - 15.0 %    Platelet Count 290 150 - 450 10e3/uL    % Neutrophils 80 %    % Lymphocytes 12 %    % Monocytes 5 %    % Eosinophils 1 %    % Basophils 1 %    % Immature  Granulocytes 0 %    NRBCs per 100 WBC 0 <1 /100    Absolute Neutrophils 6.6 1.6 - 8.3 10e3/uL    Absolute Lymphocytes 1.0 0.8 - 5.3 10e3/uL    Absolute Monocytes 0.4 0.0 - 1.3 10e3/uL    Absolute Eosinophils 0.1 0.0 - 0.7 10e3/uL    Absolute Basophils 0.1 0.0 - 0.2 10e3/uL    Absolute Immature Granulocytes 0.0 <=0.4 10e3/uL    Absolute NRBCs 0.0 10e3/uL   Magnesium   Result Value Ref Range    Magnesium 2.0 1.7 - 2.3 mg/dL   Phosphorus   Result Value Ref Range    Phosphorus 3.0 2.5 - 4.5 mg/dL   Hemoglobin A1c   Result Value Ref Range    Hemoglobin A1C 5.3 <5.7 %   TSH with free T4 reflex   Result Value Ref Range    TSH 1.57 0.30 - 4.20 uIU/mL   CT Thoracic Spine w/o Contrast    Narrative    EXAM: CT THORACIC SPINE W/O CONTRAST, CT LUMBAR SPINE W/O CONTRAST  LOCATION: [facility]  DATE/TIME: 8/29/2024 11:57 AM CDT    INDICATION: Back pain after trauma  COMPARISON: None available at time dictation  TECHNIQUE:   1) Routine CT Thoracic Spine without IV contrast. Multiplanar reformats. Dose reduction techniques were used.   2) Routine CT Lumbar Spine without IV contrast. Multiplanar reformats. Dose reduction techniques were used.     FINDINGS:  THORACIC SPINE CT:  VERTEBRA: The spine is imaged from inferior endplate of C6 through superior endplate of L2. Slightly exaggerated thoracic kyphosis without significant spondylolisthesis. Age-indeterminate compression deformity of these T6 vertebral body (70% height   loss). No aggressive osseous lesion.      CANAL/FORAMINA: Multilevel degenerative changes without high-grade spinal canal stenosis or high-grade neural foraminal narrowing.     PARASPINAL: No prevertebral or paravertebral soft tissue swelling.  Visualized lungs are clear. The thyroid gland is unremarkable. Mild coronary artery calcium. Dense mitral annulus calcification.    LUMBAR SPINE CT:  VERTEBRA: The spine is imaged from inferior endplate of L1 through superior endplate of S3. There are  5 lumbar type vertebral bodies. Straightening of the usual spinal curvature without significant spondylolisthesis. Chronic appearing compression   deformity of the L3 vertebral body without convincing evidence of acute fracture. No aggressive osseous lesion.      CANAL/FORAMINA: Multilevel degenerative changes including severe spinal canal stenosis and moderate bilateral neural foraminal narrowing at L4-L5 and L5-S1 with moderate.     PARASPINAL: No prevertebral or paravertebral soft tissue swelling. Sigmoid diverticulosis.       Impression    IMPRESSION:    THORACIC SPINE CT:  1. Age indeterminate compression deformity of these T6 vertebral body (70% height loss), which could be better evaluated with dedicated thoracic spine MRI if desired.    LUMBAR SPINE CT:  1. Chronic appearing compression deformity of the L3 vertebral body without convincing evidence of acute fracture.    2. Multilevel degenerative changes including severe spinal canal stenosis and moderate bilateral neural foraminal narrowing at L4-L5 and L5-S1 with moderate.    CT Lumbar Spine w/o Contrast    Narrative    EXAM: CT THORACIC SPINE W/O CONTRAST, CT LUMBAR SPINE W/O CONTRAST  LOCATION: [facility]  DATE/TIME: 8/29/2024 11:57 AM CDT    INDICATION: Back pain after trauma  COMPARISON: None available at time dictation  TECHNIQUE:   1) Routine CT Thoracic Spine without IV contrast. Multiplanar reformats. Dose reduction techniques were used.   2) Routine CT Lumbar Spine without IV contrast. Multiplanar reformats. Dose reduction techniques were used.     FINDINGS:  THORACIC SPINE CT:  VERTEBRA: The spine is imaged from inferior endplate of C6 through superior endplate of L2. Slightly exaggerated thoracic kyphosis without significant spondylolisthesis. Age-indeterminate compression deformity of these T6 vertebral body (70% height   loss). No aggressive osseous lesion.      CANAL/FORAMINA: Multilevel degenerative changes without  high-grade spinal canal stenosis or high-grade neural foraminal narrowing.     PARASPINAL: No prevertebral or paravertebral soft tissue swelling.  Visualized lungs are clear. The thyroid gland is unremarkable. Mild coronary artery calcium. Dense mitral annulus calcification.    LUMBAR SPINE CT:  VERTEBRA: The spine is imaged from inferior endplate of L1 through superior endplate of S3. There are 5 lumbar type vertebral bodies. Straightening of the usual spinal curvature without significant spondylolisthesis. Chronic appearing compression   deformity of the L3 vertebral body without convincing evidence of acute fracture. No aggressive osseous lesion.      CANAL/FORAMINA: Multilevel degenerative changes including severe spinal canal stenosis and moderate bilateral neural foraminal narrowing at L4-L5 and L5-S1 with moderate.     PARASPINAL: No prevertebral or paravertebral soft tissue swelling. Sigmoid diverticulosis.       Impression    IMPRESSION:    THORACIC SPINE CT:  1. Age indeterminate compression deformity of these T6 vertebral body (70% height loss), which could be better evaluated with dedicated thoracic spine MRI if desired.    LUMBAR SPINE CT:  1. Chronic appearing compression deformity of the L3 vertebral body without convincing evidence of acute fracture.    2. Multilevel degenerative changes including severe spinal canal stenosis and moderate bilateral neural foraminal narrowing at L4-L5 and L5-S1 with moderate.    CT Pelvis Bone wo Contrast    Narrative    EXAM: CT PELVIS BONE WO CONTRAST  LOCATION: [facility]  DATE: 8/29/2024    INDICATION: h o repeated falls recently, now has tender over both pubic rami and  has back pain with movement  COMPARISON: None.  TECHNIQUE: CT scan of the pelvis was performed without IV contrast. Multiplanar reformats were obtained. Dose reduction techniques were used.  CONTRAST: None.    FINDINGS:    There is diffuse osseous demineralization. There  are postoperative changes from left total hip arthroplasty, the femoral component of which extends beyond the field-of-view distally. Streak artifact from the hardware degrades evaluation of adjacent   structures.    No acute pelvic fracture is identified. Some lucency along the right greater trochanter is favored to be due to a small nutrient foramen or enthesopathic irregularity rather than a nondisplaced fracture.    Moderate right hip osteoarthritis. Mild degenerative changes at the sacroiliac joints. There are degenerative changes in the lower lumbar spine which are better evaluated on the concurrent lumbar spine CT from the same day, please see that report for   further details.    The bladder is distended. No pelvic free fluid. Arterial calcifications are noted. There is a partially visualized fluid signal lesion along the right kidney measuring up to 6.3 cm.      Impression    IMPRESSION:  1.  No acute pelvic fracture is identified. Given the degree of osseous demineralization, MRI would be more sensitive for nondisplaced fractures and should be considered if there is persistent clinical concern.  2.  Moderate right hip osteoarthritis. 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  3.  There is a partially visualized fluid signal lesion along the right kidney measuring approximately 6.3 cm, which could represent a renal cyst but is only partially visualized on this study and indeterminate. Correlation with any prior cross-sectional   imaging of the abdomen is recommended.                 Lumbar spine MRI w/o contrast    Narrative    EXAM: MR LUMBAR SPINE W/O CONTRAST  LOCATION: [facility]  DATE: 08/29/2024    INDICATION: Trauma, pain.  COMPARISON: CT 08/29/2024.  TECHNIQUE: Routine Lumbar Spine MRI without IV contrast.    FINDINGS:   NOMENCLATURE: Five lumbar-type vertebral  bodies.    ALIGNMENT: Slight L4-L5 and L3-L4 retrolisthesis. Slight L2-L3 anterolisthesis. Moderate broad-based levoconvex curvature.    BONES: Mild to moderate chronic L3 superior endplate compression fracture deformity with no evidence for edema. An L3 compression fracture is described in a plain film report from 08/10/2021, though those images are not available for direct comparison.   The remaining vertebral bodies are unremarkable in height. Marrow signal is heterogeneous but without specific evidence of a marrow-replacing process. The visualized portions of the sacrum and jet appear intact.    DISCS: Moderate multilevel interbody degenerative change described in further detail below.    SPINAL CORD: The conus terminates at L2. Trace fatty signal along the filum. Otherwise, no convincing signal abnormalities of the imaged cord or cauda equina nerve roots.     SPINAL CANAL: Moderate L4-L5 spinal canal stenosis.    PARASPINAL SOFT TISSUES: No acute abnormality. Paraspinal muscular atrophy.    ABDOMINAL CAVITY: No acute abnormality demonstrated within technique limitations. Cholelithiasis. Presumed bilateral renal cysts.    SIGNIFICANT FINDINGS BY LEVEL:  T12-L1: Normal disc height and signal. No herniation. Normal facets. No spinal canal or neural foraminal stenosis.     L1-L2: Normal disc height and signal. No herniation. Normal facets. No spinal canal or neural foraminal stenosis.    L2-L3: Moderate intervertebral disc height loss. Disc desiccation. Shallow bulge. Mild facet arthropathy. Mild spinal canal stenosis. Moderate right lateral recess stenosis. Mild to moderate right and mild left neural foraminal stenosis.     L3-L4: Moderate intervertebral disc height loss. Disc desiccation. Broad-based bulge. Moderate right and mild to moderate left facet arthropathy. Ligamentum flavum thickening. Mild to moderate spinal canal stenosis. Moderate right lateral recess   stenosis. Mild to moderate right and no  significant left neural foraminal stenosis.    L4-L5: Advanced intervertebral disc height loss. Disc desiccation. Interbody spurring. Moderate facet arthropathy. Ligamentum flavum thickening. Moderate spinal canal stenosis. Moderate to severe right lateral recess stenosis encroaching upon the   descending right L5 nerve root. Moderate right and moderate to severe left neural foraminal stenosis.    L5-S1: Mild intervertebral disc height loss. Disc desiccation. Shallow bulge. Left-sided far lateral disc-osteophyte complex. Moderate right and moderate left facet arthropathy. Ligamentum flavum thickening. Mild to moderate spinal canal stenosis.   Moderate to severe bilateral lateral recess stenosis encroaching upon the descending S1 nerve roots. Mild bilateral neural foraminal stenosis.      Impression    IMPRESSION:  1.  Chronic L3 superior endplate compression fracture deformity.  2.  No evidence for acute fracture.  3.  Multilevel degenerative change as above.   Thoracic spine MRI w/o contrast    Narrative    EXAM: MR THORACIC SPINE W/O CONTRAST  LOCATION: [facility]  DATE: 8/29/2024    INDICATION: Back pain after fall. Compression deformity of T6 and L3 on CT. ? chronic. 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  COMPARISON: CT 8/29/2024.  TECHNIQUE: Routine Thoracic Spine MRI without IV contrast.    FINDINGS:     ALIGNMENT: Exaggerated thoracic kyphosis centered at T6-T7. No significant spondylolisthesis. Mild broad-based dextroconvex thoracic curvature.    BONES: Moderate T6 wedge compression fracture deformity again demonstrated. There is trace edema along the inferior endplate. This is favored to represent a subacute fracture or a chronic fracture with Modic I degenerative endplate signal change. The   remaining vertebral bodies are unremarkable in height. No findings to suggest a marrow-replacing process. Subtle Modic I signal change along the T7 superior endplate. Nonspecific  STIR-hyperintense signal of the left T8 transverse process, without   convincing evidence of displaced fracture on the comparison CT.    DISCS: Mild spondylosis. No large posterior disc abnormalities. Shallow broad-based right paracentral disc protrusion at T6-T7.    SPINAL CORD: No convincing signal abnormalities.    SPINAL CANAL: No high-grade stenosis. No findings to suggest epidural hematoma.    NEURAL FORAMINA: No high-grade stenosis.    PARASPINAL SOFT TISSUES: Unremarkable.    THORACIC CAVITY: Shallow left pleural effusion.      Impression    IMPRESSION:  1.  Moderate T6 wedge compression fracture deformity with trace edema. The pattern favors either a late subacute fracture or a chronic fracture with Modic I degenerative endplate signal change.  2.  Nonspecific T2/STIR-hyperintense signal abnormality of the left T8 transverse process without convincing evidence of fracture on CT. This is nonspecific and may benefit from follow-up imaging.  3.  Mild degenerative change without high-grade stenosis of the spinal canal or neural foramina.   UA with Microscopic reflex to Culture    Specimen: Urine, Clean Catch   Result Value Ref Range    Color Urine Yellow Colorless, Straw, Light Yellow, Yellow    Appearance Urine Clear Clear    Glucose Urine Negative Negative mg/dL    Bilirubin Urine Negative Negative    Ketones Urine Trace (A) Negative mg/dL    Specific Gravity Urine 1.023 1.001 - 1.030    Blood Urine Negative Negative    pH Urine 6.5 5.0 - 7.0    Protein Albumin Urine 20 (A) Negative mg/dL    Urobilinogen Urine 2.0 (A) <2.0 mg/dL    Nitrite Urine Negative Negative    Leukocyte Esterase Urine 25 Ann/uL (A) Negative    Mucus Urine Present (A) None Seen /LPF    RBC Urine 1 <=2 /HPF    WBC Urine 1 <=5 /HPF    Squamous Epithelials Urine <1 <=1 /HPF    Hyaline Casts Urine 6 (H) <=2 /LPF    Narrative    Urine Culture not indicated   US Renal Complete Non-Vascular    Narrative    EXAM: US RENAL COMPLETE  NON-VASCULAR  LOCATION: [facility]  DATE: 8/29/2024    INDICATION: renal cyst  COMPARISON: CT pelvis the same day.  TECHNIQUE: Routine Bilateral Renal and Bladder Ultrasound.    FINDINGS:  Technically limited examination due to patient mental status and limited positioning.    RIGHT KIDNEY: 10.6 cm. Normal without hydronephrosis or masses. Simple appearing right lower pole renal cyst measuring up to 6.1 cm.    LEFT KIDNEY: 8.7 cm. Limited evaluation of the left kidney due to overlying shadowing. No hydronephrosis. Simple appearing cyst in the upper pole measures up to 1.8 cm.     BLADDER: Minimally distended and poorly evaluated.      Impression    IMPRESSION:  1.  Technically limited examination as above.  2.  Simple appearing bilateral renal cysts. No dedicated imaging follow-up is required.  3.  No hydronephrosis.   Comprehensive metabolic panel   Result Value Ref Range    Sodium 140 135 - 145 mmol/L    Potassium 3.8 3.4 - 5.3 mmol/L    Carbon Dioxide (CO2) 21 (L) 22 - 29 mmol/L    Anion Gap 12 7 - 15 mmol/L    Urea Nitrogen 15.3 8.0 - 23.0 mg/dL    Creatinine 0.82 0.51 - 0.95 mg/dL    GFR Estimate 69 >60 mL/min/1.73m2    Calcium 9.1 8.8 - 10.4 mg/dL    Chloride 107 98 - 107 mmol/L    Glucose 82 70 - 99 mg/dL    Alkaline Phosphatase 91 40 - 150 U/L    AST 21 0 - 45 U/L    ALT 10 0 - 50 U/L    Protein Total 6.8 6.4 - 8.3 g/dL    Albumin 3.7 3.5 - 5.2 g/dL    Bilirubin Total 0.5 <=1.2 mg/dL   CBC with platelets   Result Value Ref Range    WBC Count 6.2 4.0 - 11.0 10e3/uL    RBC Count 3.91 3.80 - 5.20 10e6/uL    Hemoglobin 12.9 11.7 - 15.7 g/dL    Hematocrit 39.1 35.0 - 47.0 %     78 - 100 fL    MCH 33.0 26.5 - 33.0 pg    MCHC 33.0 31.5 - 36.5 g/dL    RDW 12.9 10.0 - 15.0 %    Platelet Count 238 150 - 450 10e3/uL   Magnesium   Result Value Ref Range    Magnesium 2.0 1.7 - 2.3 mg/dL   Phosphorus   Result Value Ref Range    Phosphorus 2.8 2.5 - 4.5 mg/dL   Glucose by meter   Result  Value Ref Range    GLUCOSE BY METER POCT 76 70 - 99 mg/dL       All relevant imaging and laboratory values personally reviewed.    Dragon Disclosure   This was created at least in part with a voice recognition software. Mistakes/typos may be present.

## 2024-08-31 NOTE — PLAN OF CARE
PRIMARY DIAGNOSIS: SYNCOPE/TIA  OUTPATIENT/OBSERVATION GOALS TO BE MET BEFORE DISCHARGE:  1. Orthostatic performed: N/A    2. Diagnostic testing complete & at baseline neurologic testing: N/A    3. Cleared by consultants (if involved): Yes    4. Interpretation of cardiac rhythm per telemetry tech: not on Telemetry    5. Tolerating adequate PO diet and medications: Yes    6. Return to near baseline physical activity or neurologic status: Yes    Discharge Planner Nurse   Safe discharge environment identified: Yes - pt family wants pt to discharge tomorrow around 0830 to meet with hospice for a consult [facility].  Barriers to discharge: No       Entered by: Cathryn Davis RN 08/30/2024 8:02 PM     Please review provider order for any additional goals.   Nurse to notify provider when observation goals have been met and patient is ready for discharge.

## 2024-08-31 NOTE — PLAN OF CARE
PRIMARY DIAGNOSIS: GENERALIZED WEAKNESS    OUTPATIENT/OBSERVATION GOALS TO BE MET BEFORE DISCHARGE  1. Orthostatic performed: N/A    2. Tolerating PO medications: Yes    3. Return to near baseline physical activity: No    4. Cleared for discharge by consultants (if involved): Yes    Discharge Planner Nurse   Safe discharge environment identified: Yes  Barriers to discharge: No       Entered by: Cathryn Davis RN 08/30/2024 11:01 PM     Please review provider order for any additional goals.   Nurse to notify provider when observation goals have been met and patient is ready for discharge.Goal Outcome Evaluation:  Family have arranged a hospice consult for pt tomorrow morning at her [facility] [place] and would like an early dischage.  Pt is confused, needs coaxing to be turned off excoriated sacrum.  Mepilex covering.

## 2024-08-31 NOTE — PLAN OF CARE
Problem: Adult Inpatient Plan of Care  Goal: Plan of Care Review  Description: The Plan of Care Review/Shift note should be completed every shift.  The Outcome Evaluation is a brief statement about your assessment that the patient is improving, declining, or no change.  This information will be displayed automatically on your shift  note.  Outcome: Progressing     Problem: Adult Inpatient Plan of Care  Goal: Optimal Comfort and Wellbeing  Outcome: Progressing     Problem: Pain Acute  Goal: Optimal Pain Control and Function  Outcome: Progressing  Intervention: Prevent or Manage Pain  Recent Flowsheet Documentation  Taken 8/31/2024 0040 by Akash Morgan RN  Bowel Elimination Promotion: adequate fluid intake promoted  Medication Review/Management: high-risk medications identified  Intervention: Optimize Psychosocial Wellbeing  Recent Flowsheet Documentation  Taken 8/31/2024 0040 by Akash Morgan RN  Supportive Measures: active listening utilized     Problem: Syncope  Goal: Absence of Syncopal Symptoms  Outcome: Progressing  Intervention: Manage Effect of Syncopal Symptoms  Recent Flowsheet Documentation  Taken 8/31/2024 0040 by Akash Morgan RN  Safety Promotion/Fall Prevention:   activity supervised   nonskid shoes/slippers when out of bed   supervised activity  Supportive Measures: active listening utilized   Goal Outcome Evaluation:  Slept well. Low dose Oxycodone x1 for back pain with good relief. Up to the BSC with assist of 2. Fluids offered and encouraged.

## 2024-08-31 NOTE — DISCHARGE SUMMARY
[facility]  Hospitalist Discharge Summary      Date of Admission:  8/29/2024  Date of Discharge:  8/31/2024  Discharging Provider: Rafa Harvey MD  Discharge Service: Hospitalist Service    Discharge Diagnoses   Syncope likely due to combination of vasovagal and dehydration  Asymptomatic bacteriuria  CATARINO on admission  Stage I pressure ulcer of the buttock on admission  Chronic T6 fracture  Simple renal cyst  History of hypothyroidism  Severe dementia.  Patient is totally dependent on others for self-cares. lives in a nursing home    Clinically Significant Risk Factors          Follow-ups Needed After Discharge   Follow-up Appointments     Follow-up and recommended labs and tests       Follow up with primary care provider, Helen Craven, within 7 days for   hospital follow- up.  No follow up labs or test are needed.        {    Unresulted Labs Ordered in the Past 30 Days of this Admission       No orders found from 7/30/2024 to 8/30/2024.        These results will be followed up by not applicable    Discharge Disposition   Discharged to long-term care facility  Condition at discharge: [facility]   89-year-old with severe dementia who had a syncope episode while having breakfast for 2 to 3 minutes.  No seizure activity found.  Patient was found to have dehydration and CATARINO on admission which resolved after IV fluids.  EKG and echo are unremarkable patient cannot give any reliable information.  Neurosurgery consulted for T6 fracture but was deemed as chronic.  Family updated with likely discharge on 8/31    Syncope likely combination of vasovagal and dehydration  --Patient must drink at least 4 glasses 8 ounces of water a day  -- Echo done shows EF of 60 to 65%.  -- EKG was unremarkable  --Telemetry for 12 hours did not show any arrhythmia        Asymptomatic bacteriuria  -- No treatment necessary     CATARINO on admission but now resolved  -- DC IV fluids     Stage I pressure ulcer of  the buttocks on presentation  -- Continue wound care    T6 fracture likely chronic  -- Neurosurgery consulted  -- No intervention planned     Simple renal cyst on renal ultrasound  -- No intervention needed     History of hypothyroidism--controlled        TSH   Date Value Ref Range Status   08/29/2024 1.57 0.30 - 4.20 uIU/mL Final         Dementia  --Patient is totally dependent on others for self-care.  Lives in nursing home  --Continue home medications    Consultations This Hospital Stay   CARE MANAGEMENT / SOCIAL WORK IP CONSULT  PHYSICAL THERAPY ADULT IP CONSULT  OCCUPATIONAL THERAPY ADULT IP CONSULT  WOUND OSTOMY CONTINENCE NURSE  IP CONSULT  NEUROSURGERY IP CONSULT    Code Status   Full Code    Time Spent on this Encounter   I, Rafa Harvey MD, personally saw the patient today and spent greater than 30 minutes discharging this patient.       Rafa Harvey MD  [address] [address]  Phone: [phone]  Fax: [phone]  ______________________________________________________________________    Physical Exam   Vital Signs: Temp: 98.8  F (37.1  C) Temp src: Oral BP: (!) 122/100 Pulse: 98   Resp: 18 SpO2: 100 % O2 Device: None (Room air)    Weight: 140 lbs 0 oz  Patient is confused and cannot give reliable information       Primary Care Physician   Helen Craven    Discharge Orders      Reason for your hospital stay    Syncope     Follow-up and recommended labs and tests     Follow up with primary care provider, Helen Craven, within 7 days for hospital follow- up.  No follow up labs or test are needed.     Activity    Your activity upon discharge: activity as tolerated     Discharge Instructions    Pt must drink at least four glasses of 8-ounce water a day to avoid dehydration     Diet    Follow this diet upon discharge: Current Diet:Orders Placed This Encounter      Combination Diet Regular Diet Adult  Pt must drink at least four glasses of 8-ounce  water a day to avoid dehydration       Significant Results and Procedures   Results for orders placed or performed during the hospital encounter of 08/29/24   CT Lumbar Spine w/o Contrast    Narrative    EXAM: CT THORACIC SPINE W/O CONTRAST, CT LUMBAR SPINE W/O CONTRAST  LOCATION: [facility]  DATE/TIME: 8/29/2024 11:57 AM CDT    INDICATION: Back pain after trauma  COMPARISON: None available at time dictation  TECHNIQUE:   1) Routine CT Thoracic Spine without IV contrast. Multiplanar reformats. Dose reduction techniques were used.   2) Routine CT Lumbar Spine without IV contrast. Multiplanar reformats. Dose reduction techniques were used.     FINDINGS:  THORACIC SPINE CT:  VERTEBRA: The spine is imaged from inferior endplate of C6 through superior endplate of L2. Slightly exaggerated thoracic kyphosis without significant spondylolisthesis. Age-indeterminate compression deformity of these T6 vertebral body (70% height   loss). No aggressive osseous lesion.      CANAL/FORAMINA: Multilevel degenerative changes without high-grade spinal canal stenosis or high-grade neural foraminal narrowing.     PARASPINAL: No prevertebral or paravertebral soft tissue swelling.  Visualized lungs are clear. The thyroid gland is unremarkable. Mild coronary artery calcium. Dense mitral annulus calcification.    LUMBAR SPINE CT:  VERTEBRA: The spine is imaged from inferior endplate of L1 through superior endplate of S3. There are 5 lumbar type vertebral bodies. Straightening of the usual spinal curvature without significant spondylolisthesis. Chronic appearing compression   deformity of the L3 vertebral body without convincing evidence of acute fracture. No aggressive osseous lesion.      CANAL/FORAMINA: Multilevel degenerative changes including severe spinal canal stenosis and moderate bilateral neural foraminal narrowing at L4-L5 and L5-S1 with moderate.     PARASPINAL: No prevertebral or paravertebral soft tissue  swelling. Sigmoid diverticulosis.       Impression    IMPRESSION:    THORACIC SPINE CT:  1. Age indeterminate compression deformity of these T6 vertebral body (70% height loss), which could be better evaluated with dedicated thoracic spine MRI if desired.    LUMBAR SPINE CT:  1. Chronic appearing compression deformity of the L3 vertebral body without convincing evidence of acute fracture.    2. Multilevel degenerative changes including severe spinal canal stenosis and moderate bilateral neural foraminal narrowing at L4-L5 and L5-S1 with moderate.    CT Thoracic Spine w/o Contrast    Narrative    EXAM: CT THORACIC SPINE W/O CONTRAST, CT LUMBAR SPINE W/O CONTRAST  LOCATION: [facility]  DATE/TIME: 8/29/2024 11:57 AM CDT    INDICATION: Back pain after trauma  COMPARISON: None available at time dictation  TECHNIQUE:   1) Routine CT Thoracic Spine without IV contrast. Multiplanar reformats. Dose reduction techniques were used.   2) Routine CT Lumbar Spine without IV contrast. Multiplanar reformats. Dose reduction techniques were used.     FINDINGS:  THORACIC SPINE CT:  VERTEBRA: The spine is imaged from inferior endplate of C6 through superior endplate of L2. Slightly exaggerated thoracic kyphosis without significant spondylolisthesis. Age-indeterminate compression deformity of these T6 vertebral body (70% height   loss). No aggressive osseous lesion.      CANAL/FORAMINA: Multilevel degenerative changes without high-grade spinal canal stenosis or high-grade neural foraminal narrowing.     PARASPINAL: No prevertebral or paravertebral soft tissue swelling.  Visualized lungs are clear. The thyroid gland is unremarkable. Mild coronary artery calcium. Dense mitral annulus calcification.    LUMBAR SPINE CT:  VERTEBRA: The spine is imaged from inferior endplate of L1 through superior endplate of S3. There are 5 lumbar type vertebral bodies. Straightening of the usual spinal curvature without significant  spondylolisthesis. Chronic appearing compression   deformity of the L3 vertebral body without convincing evidence of acute fracture. No aggressive osseous lesion.      CANAL/FORAMINA: Multilevel degenerative changes including severe spinal canal stenosis and moderate bilateral neural foraminal narrowing at L4-L5 and L5-S1 with moderate.     PARASPINAL: No prevertebral or paravertebral soft tissue swelling. Sigmoid diverticulosis.       Impression    IMPRESSION:    THORACIC SPINE CT:  1. Age indeterminate compression deformity of these T6 vertebral body (70% height loss), which could be better evaluated with dedicated thoracic spine MRI if desired.    LUMBAR SPINE CT:  1. Chronic appearing compression deformity of the L3 vertebral body without convincing evidence of acute fracture.    2. Multilevel degenerative changes including severe spinal canal stenosis and moderate bilateral neural foraminal narrowing at L4-L5 and L5-S1 with moderate.    CT Pelvis Bone wo Contrast    Narrative    EXAM: CT PELVIS BONE WO CONTRAST  LOCATION: [facility]  DATE: 8/29/2024    INDICATION: h o repeated falls recently, now has tender over both pubic rami and  has back pain with movement  COMPARISON: None.  TECHNIQUE: CT scan of the pelvis was performed without IV contrast. Multiplanar reformats were obtained. Dose reduction techniques were used.  CONTRAST: None.    FINDINGS:    There is diffuse osseous demineralization. 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 Streak artifact from the hardware degrades evaluation of adjacent   structures.    No acute pelvic fracture is identified. Some lucency along the right greater trochanter is favored to be due to a small nutrient foramen or enthesopathic irregularity rather than a nondisplaced fracture.    Moderate right hip osteoarthritis. Mild degenerative changes at the  sacroiliac joints. There are degenerative changes in the lower lumbar spine which are better evaluated on the concurrent lumbar spine CT from the same day, please see that report for   further details.    The bladder is distended. No pelvic free fluid. Arterial calcifications are noted. There is a partially visualized fluid signal lesion along the right kidney measuring up to 6.3 cm.      Impression    IMPRESSION:  1.  No acute pelvic fracture is identified. Given the degree of osseous demineralization, MRI would be more sensitive for nondisplaced fractures and should be considered if there is persistent clinical concern.  2.  Moderate right hip osteoarthritis. 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  3.  There is a partially visualized fluid signal lesion along the right kidney measuring approximately 6.3 cm, which could represent a renal cyst but is only partially visualized on this study and indeterminate. Correlation with any prior cross-sectional   imaging of the abdomen is recommended.                 Lumbar spine MRI w/o contrast    Narrative    EXAM: MR LUMBAR SPINE W/O CONTRAST  LOCATION: [facility]  DATE: 08/29/2024    INDICATION: Trauma, pain.  COMPARISON: CT 08/29/2024.  TECHNIQUE: Routine Lumbar Spine MRI without IV contrast.    FINDINGS:   NOMENCLATURE: Five lumbar-type vertebral bodies.    ALIGNMENT: Slight L4-L5 and L3-L4 retrolisthesis. Slight L2-L3 anterolisthesis. Moderate broad-based levoconvex curvature.    BONES: Mild to moderate chronic L3 superior endplate compression fracture deformity with no evidence for edema. An L3 compression fracture is described in a plain film report from 08/10/2021, though those images are not available for direct comparison.   The remaining vertebral bodies are unremarkable in height. Marrow signal is heterogeneous but without specific  evidence of a marrow-replacing process. The visualized portions of the sacrum and jet appear intact.    DISCS: Moderate multilevel interbody degenerative change described in further detail below.    SPINAL CORD: The conus terminates at L2. Trace fatty signal along the filum. Otherwise, no convincing signal abnormalities of the imaged cord or cauda equina nerve roots.     SPINAL CANAL: Moderate L4-L5 spinal canal stenosis.    PARASPINAL SOFT TISSUES: No acute abnormality. Paraspinal muscular atrophy.    ABDOMINAL CAVITY: No acute abnormality demonstrated within technique limitations. Cholelithiasis. Presumed bilateral renal cysts.    SIGNIFICANT FINDINGS BY LEVEL:  T12-L1: Normal disc height and signal. No herniation. Normal facets. No spinal canal or neural foraminal stenosis.     L1-L2: Normal disc height and signal. No herniation. Normal facets. No spinal canal or neural foraminal stenosis.    L2-L3: Moderate intervertebral disc height loss. Disc desiccation. Shallow bulge. Mild facet arthropathy. Mild spinal canal stenosis. Moderate right lateral recess stenosis. Mild to moderate right and mild left neural foraminal stenosis.     L3-L4: Moderate intervertebral disc height loss. Disc desiccation. Broad-based bulge. Moderate right and mild to moderate left facet arthropathy. Ligamentum flavum thickening. Mild to moderate spinal canal stenosis. Moderate right lateral recess   stenosis. Mild to moderate right and no significant left neural foraminal stenosis.    L4-L5: Advanced intervertebral disc height loss. Disc desiccation. Interbody spurring. Moderate facet arthropathy. Ligamentum flavum thickening. Moderate spinal canal stenosis. Moderate to severe right lateral recess stenosis encroaching upon the   descending right L5 nerve root. Moderate right and moderate to severe left neural foraminal stenosis.    L5-S1: Mild intervertebral disc height loss. Disc desiccation. Shallow bulge. Left-sided far lateral  disc-osteophyte complex. Moderate right and moderate left facet arthropathy. Ligamentum flavum thickening. Mild to moderate spinal canal stenosis.   Moderate to severe bilateral lateral recess stenosis encroaching upon the descending S1 nerve roots. Mild bilateral neural foraminal stenosis.      Impression    IMPRESSION:  1.  Chronic L3 superior endplate compression fracture deformity.  2.  No evidence for acute fracture.  3.  Multilevel degenerative change as above.   Thoracic spine MRI w/o contrast    Narrative    EXAM: MR THORACIC SPINE W/O CONTRAST  LOCATION: [facility]  DATE: 8/29/2024    INDICATION: Back pain after fall. Compression deformity of T6 and L3 on CT. ? chronic. 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  COMPARISON: CT 8/29/2024.  TECHNIQUE: Routine Thoracic Spine MRI without IV contrast.    FINDINGS:     ALIGNMENT: Exaggerated thoracic kyphosis centered at T6-T7. No significant spondylolisthesis. Mild broad-based dextroconvex thoracic curvature.    BONES: Moderate T6 wedge compression fracture deformity again demonstrated. There is trace edema along the inferior endplate. This is favored to represent a subacute fracture or a chronic fracture with Modic I degenerative endplate signal change. The   remaining vertebral bodies are unremarkable in height. No findings to suggest a marrow-replacing process. Subtle Modic I signal change along the T7 superior endplate. Nonspecific STIR-hyperintense signal of the left T8 transverse process, without   convincing evidence of displaced fracture on the comparison CT.    DISCS: Mild spondylosis. No large posterior disc abnormalities. Shallow broad-based right paracentral disc protrusion at T6-T7.    SPINAL CORD: No convincing signal abnormalities.    SPINAL CANAL: No high-grade stenosis. No findings to suggest epidural hematoma.    NEURAL FORAMINA: No high-grade stenosis.    PARASPINAL SOFT TISSUES: Unremarkable.    THORACIC CAVITY: Shallow  left pleural effusion.      Impression    IMPRESSION:  1.  Moderate T6 wedge compression fracture deformity with trace edema. The pattern favors either a late subacute fracture or a chronic fracture with Modic I degenerative endplate signal change.  2.  Nonspecific T2/STIR-hyperintense signal abnormality of the left T8 transverse process without convincing evidence of fracture on CT. This is nonspecific and may benefit from follow-up imaging.  3.  Mild degenerative change without high-grade stenosis of the spinal canal or neural foramina.   US Renal Complete Non-Vascular    Narrative    EXAM: US RENAL COMPLETE NON-VASCULAR  LOCATION: [facility]  DATE: 2024    INDICATION: renal cyst  COMPARISON: CT pelvis the same day.  TECHNIQUE: Routine Bilateral Renal and Bladder Ultrasound.    FINDINGS:  Technically limited examination due to patient mental status and limited positioning.    RIGHT KIDNEY: 10.6 cm. Normal without hydronephrosis or masses. Simple appearing right lower pole renal cyst measuring up to 6.1 cm.    LEFT KIDNEY: 8.7 cm. Limited evaluation of the left kidney due to overlying shadowing. No hydronephrosis. Simple appearing cyst in the upper pole measures up to 1.8 cm.     BLADDER: Minimally distended and poorly evaluated.      Impression    IMPRESSION:  1.  Technically limited examination as above.  2.  Simple appearing bilateral renal cysts. No dedicated imaging follow-up is required.  3.  No hydronephrosis.   Echocardiogram Complete     Value    LVEF  60-65%    Narrative    [id number]  [id number]  [id number]  [id number]^GONDAL^AILYN^[place] [address]     Name: EDDIE CURRY  MRN: [id number]  : 1935  Study Date: 2024 10:27 AM  Age: 89 yrs  Gender: Female  Patient Location: [facility]  Reason For Study: Syncope and Collapse  Ordering Physician: GONDAL, SAAD J  Performed By: GABI     BSA: 1.8 m2  Height: 71 in  Weight: 140 lb  HR:  84  ______________________________________________________________________________  Procedure  Complete Portable Echo Adult.  ______________________________________________________________________________  Interpretation Summary     Left ventricular size, wall motion and function are normal. The ejection  fraction is 60-65%.  Diastolic Doppler findings (E/E' ratio and/or other parameters) suggest left  ventricular filling pressures are increased.  Normal right ventricle size and systolic function.  The left atrium is mild to moderately dilated.  There is mild (1+) mitral regurgitation.  There is mild (1+) tricuspid regurgitation.  The right ventricular systolic pressure is approximated at 38 mmHg.     No previous study for comparison.     .     ______________________________________________________________________________  I      WMSI = 1.00     % Normal = 100     X - Cannot   0 -                      (2) - Mildly 2 -          Segments  Size  Interpret    Hyperkinetic 1 - Normal  Hypokinetic  Hypokinetic  1-2     small                                                     7 -          3-5      moderate  3 - Akinetic 4 -          5 -         6 - Akinetic Dyskinetic   6-14    large               Dyskinetic   Aneurysmal  w/scar       w/scar       15-16   diffuse     Left Ventricle  Left ventricular size, wall motion and function are normal. The ejection  fraction is 60-65%. There is normal left ventricular wall thickness. Diastolic  Doppler findings (E/E' ratio and/or other parameters) suggest left ventricular  filling pressures are increased. No regional wall motion abnormalities noted.     Right Ventricle  Normal right ventricle size and systolic function.     Atria  The left atrium is mild to moderately dilated. Right atrial size is normal.  There is no atrial shunt seen.     Mitral Valve  The mitral valve leaflets appear thickened, but open well. There is mild  mitral annular calcification. There is mild (1+) mitral  regurgitation. There  is no mitral valve stenosis.     Tricuspid Valve  Tricuspid leaflets are thickened. There is mild (1+) tricuspid regurgitation.  The right ventricular systolic pressure is approximated at 30mmHg plus the  right atrial pressure. There is no tricuspid stenosis.     Aortic Valve  The aortic valve is trileaflet with aortic valve sclerosis. No aortic  regurgitation is present. No aortic stenosis is present.     Pulmonic Valve  The pulmonic valve is not well seen, but is grossly normal.     Vessels  The aorta root is normal. IVC diameter and respiratory changes fall into an  intermediate range suggesting an RA pressure of 8 mmHg.     Pericardium  There is no pericardial effusion.     ______________________________________________________________________________  MMode/2D Measurements & Calculations  IVSd: 1.1 cm  LVIDd: 3.8 cm  LVIDs: 2.4 cm  LVPWd: 1.2 cm  FS: 37.5 %  LV mass(C)d: 146.2 grams  LV mass(C)dI: 80.7 grams/m2  Ao root diam: 2.9 cm     asc Aorta Diam: 2.3 cm  LVOT diam: 1.9 cm  LVOT area: 2.8 cm2  Ao root diam index Ht(cm/m): 1.6  Ao root diam index BSA (cm/m2): 1.6  Asc Ao diam index BSA (cm/m2): 1.3  Asc Ao diam index Ht(cm/m): 1.3  EF Biplane: 58.0 %  LA Volume (BP): 60.3 ml  LA Volume Index (BP): 33.3 ml/m2     LA Volume Indexed (AL/bp): 36.5 ml/m2  RWT: 0.65  TAPSE: 2.3 cm     Doppler Measurements & Calculations  MV E max sarai: 118.0 cm/sec  MV A max sarai: 109.0 cm/sec  MV E/A: 1.1  MV max P.2 mmHg  MV mean PG: 3.0 mmHg  MV V2 VTI: 31.3 cm  MVA(VTI): 1.6 cm2  MV P1/2t max sarai: 120.0 cm/sec  MV P1/2t: 77.0 msec  MVA(P1/2t): 2.9 cm2  MV dec slope: 456.5 cm/sec2  MV dec time: 0.26 sec  Ao V2 max: 160.0 cm/sec  Ao max PG: 10.0 mmHg  Ao V2 mean: 114.0 cm/sec  Ao mean P.0 mmHg  Ao V2 VTI: 31.8 cm  ELY(I,D): 1.5 cm2  ELY(V,D): 1.7 cm2  LV V1 max PG: 3.5 mmHg  LV V1 max: 94.2 cm/sec  LV V1 VTI: 17.3 cm     SV(LVOT): 49.1 ml  SI(LVOT): 27.1 ml/m2  PA V2 max: 87.3 cm/sec  PA max PG: 3.0  mmHg  PA acc time: 0.11 sec  TR max geronimo: 274.0 cm/sec  TR max P.0 mmHg  AV Geronimo Ratio (DI): 0.59  ELY Index (cm2/m2): 0.85  E/E' av.8  Lateral E/e': 17.0  Medial E/e': 18.7  RV S Geronimo: 14.7 cm/sec     ______________________________________________________________________________  Report approved by: Ignacio Frank 2024 02:23 PM             Discharge Medications   Current Discharge Medication List        CONTINUE these medications which have NOT CHANGED    Details   acetaminophen (TYLENOL) 500 MG tablet Take 1,000 mg by mouth 2 times daily.      docusate sodium (COLACE) 100 MG capsule Take 100 mg by mouth 2 times daily as needed for constipation.      levothyroxine (SYNTHROID/LEVOTHROID) 75 MCG tablet Take 75 mcg by mouth daily.      Lidocaine (LIDOCARE) 4 % Patch Place 1 patch onto the skin 2 times daily. To prevent lidocaine toxicity, patient should be patch free for 12 hrs daily.      megestrol (MEGACE) 20 MG tablet Take 20 mg by mouth 2 times daily.      memantine (NAMENDA) 5 MG tablet Take 5 mg by mouth daily.      menthol-zinc oxide (CALMOSEPTINE) 0.44-20.6 % OINT ointment Apply topically 2 times daily. to tailbone area      mirtazapine (REMERON) 7.5 MG tablet Take 7.5 mg by mouth at bedtime.      polyethylene glycol (MIRALAX) 17 g packet Take 1 packet by mouth daily.           Allergies   Allergies   Allergen Reactions    Statins Other (See Comments)     Sensitivity to high doses

## 2024-08-31 NOTE — PLAN OF CARE
"PRIMARY DIAGNOSIS: \"GENERIC\" NURSING  OUTPATIENT/OBSERVATION GOALS TO BE MET BEFORE DISCHARGE:  ADLs back to baseline: No    Activity and level of assistance: Up with standby assistance.    Pain status: Improved-controlled with oral pain medications.    Return to near baseline physical activity: Yes     Discharge Planner Nurse   Safe discharge environment identified: Yes  Barriers to discharge: No       Entered by: Akash Morgan RN 08/31/2024 8:06 AM     Please review provider order for any additional goals.   Nurse to notify provider when observation goals have been met and patient is ready for discharge.Goal Outcome Evaluation:                        "

## 2024-08-31 NOTE — PROGRESS NOTES
Physical Therapy Discharge Summary    Reason for therapy discharge:    Discharged to long term care facility.    Progress towards therapy goal(s). See goals on Care Plan in [facility] electronic health record for goal details.  Goals not met.  Barriers to achieving goals:   discharge from facility.    Therapy recommendation(s):    No further therapy is recommended.

## 2024-08-31 NOTE — PROGRESS NOTES
Care Management Discharge Note    Discharge Date: 08/31/2024       Discharge Disposition: Assisted Living    Discharge Services: None    Discharge DME:  none    Discharge Transportation:      Private pay costs discussed: Not applicable    Does the patient's insurance plan have a 3 day qualifying hospital stay waiver?  No    PAS Confirmation Code: N/A  Patient/family educated on Medicare website which has current facility and service quality ratings: no    Education Provided on the Discharge Plan: Yes  Persons Notified of Discharge Plans: patient, patient's son, nursing, hospitalist  Patient/Family in Agreement with the Plan: yes    Handoff Referral Completed: No, handoff not indicated or clinically appropriate    Additional Information:  Patient discharging to  [facility]  via family transport. Discharge discussed and confirmed with patient, nursing, hospitalist, patient family member, Vasile., and accepting facility, [facility]. Orders faxed to: [phone]. SW also discussed discharge time with [facility] staff.     LONDON Michel

## 2024-08-31 NOTE — PLAN OF CARE
Goal Outcome Evaluation:      Problem: Adult Inpatient Plan of Care  Goal: Optimal Comfort and Wellbeing  Outcome: Adequate for Care Transition     Problem: Adult Inpatient Plan of Care  Goal: Readiness for Transition of Care  Outcome: Adequate for Care Transition     Problem: Syncope  Goal: Absence of Syncopal Symptoms  Outcome: Adequate for Care Transition       Patient is calm and cooperative this morning.  Assist of 2. Sacral mepilex peeled back and reapplied.  Continent and incontinent of urine.  Called out in pain with movement, tylenol given crushed in applesauce.  Alert to self only.  Up in chair.  Family to  at 1100 for discharge back to memory care facility on hospice.  Continue plan of care.

## 2024-08-31 NOTE — PLAN OF CARE
PRIMARY DIAGNOSIS: ACUTE PAIN  OUTPATIENT/OBSERVATION GOALS TO BE MET BEFORE DISCHARGE:  1. Pain Status: Improved-controlled with oral pain medications.    2. Return to near baseline physical activity: Yes    3. Cleared for discharge by consultants (if involved): Yes    Discharge Planner Nurse   Safe discharge environment identified: Yes  Barriers to discharge: No       Entered by: Akash Morgan RN 08/31/2024 8:04 AM     Please review provider order for any additional goals.   Nurse to notify provider when observation goals have been met and patient is ready for discharge.Goal Outcome Evaluation:

## 2024-09-01 NOTE — PLAN OF CARE
Occupational Therapy Discharge Summary    Reason for therapy discharge:    Discharged to memory care facility w/ hospice services     Progress towards therapy goal(s). See goals on Care Plan in Epic electronic health record for goal details.  NA- hospice services     Therapy recommendation(s):    No further therapy is recommended.    Goal Outcome Evaluation:             Mary Reyes, OTR/L  9/1/2024